# Patient Record
Sex: MALE | Race: WHITE | NOT HISPANIC OR LATINO | Employment: FULL TIME | URBAN - METROPOLITAN AREA
[De-identification: names, ages, dates, MRNs, and addresses within clinical notes are randomized per-mention and may not be internally consistent; named-entity substitution may affect disease eponyms.]

---

## 2023-09-15 PROBLEM — E78.2 MIXED HYPERLIPIDEMIA: Status: ACTIVE | Noted: 2023-09-15

## 2023-09-15 PROBLEM — G47.33 SEVERE OBSTRUCTIVE SLEEP APNEA: Status: ACTIVE | Noted: 2023-09-15

## 2023-09-15 PROBLEM — I10 ESSENTIAL HYPERTENSION: Status: ACTIVE | Noted: 2023-09-15

## 2023-09-15 PROBLEM — M54.30 SCIATICA: Status: ACTIVE | Noted: 2023-09-15

## 2023-09-15 PROBLEM — E11.65 TYPE 2 DIABETES MELLITUS WITH HYPERGLYCEMIA (MULTI): Status: ACTIVE | Noted: 2023-09-15

## 2023-09-15 PROBLEM — R06.02 SHORTNESS OF BREATH: Status: ACTIVE | Noted: 2023-09-15

## 2023-09-15 PROBLEM — D50.9 IRON DEFICIENCY ANEMIA: Status: ACTIVE | Noted: 2023-09-15

## 2023-09-15 PROBLEM — I82.409 ACUTE EMBOLISM AND THROMBOSIS OF UNSPECIFIED DEEP VEINS OF UNSPECIFIED LOWER EXTREMITY (MULTI): Status: ACTIVE | Noted: 2023-09-15

## 2023-09-15 PROBLEM — E66.09 OBESITY DUE TO EXCESS CALORIES: Status: ACTIVE | Noted: 2023-09-15

## 2023-09-15 RX ORDER — PEN NEEDLE, DIABETIC 32GX 5/32"
NEEDLE, DISPOSABLE MISCELLANEOUS
COMMUNITY
Start: 2022-12-13

## 2023-09-15 RX ORDER — INSULIN LISPRO 100 [IU]/ML
INJECTION, SOLUTION INTRAVENOUS; SUBCUTANEOUS
COMMUNITY
Start: 2017-04-10 | End: 2024-02-26 | Stop reason: WASHOUT

## 2023-09-15 RX ORDER — TIRZEPATIDE 7.5 MG/.5ML
12.5 INJECTION, SOLUTION SUBCUTANEOUS
COMMUNITY
Start: 2023-03-20

## 2023-09-15 RX ORDER — INSULIN GLARGINE 100 [IU]/ML
INJECTION, SOLUTION SUBCUTANEOUS
COMMUNITY
End: 2024-02-26 | Stop reason: WASHOUT

## 2023-09-15 RX ORDER — TIRZEPATIDE 5 MG/.5ML
INJECTION, SOLUTION SUBCUTANEOUS
COMMUNITY
Start: 2022-12-13 | End: 2024-02-26 | Stop reason: WASHOUT

## 2023-09-15 RX ORDER — LOSARTAN POTASSIUM 50 MG/1
TABLET ORAL
COMMUNITY
End: 2024-01-20 | Stop reason: DRUGHIGH

## 2023-09-15 RX ORDER — MECLIZINE HYDROCHLORIDE 25 MG/1
TABLET ORAL
COMMUNITY
End: 2024-02-26 | Stop reason: WASHOUT

## 2023-09-15 RX ORDER — TIZANIDINE 4 MG/1
1 TABLET ORAL NIGHTLY
COMMUNITY
Start: 2022-01-01 | End: 2024-02-26 | Stop reason: WASHOUT

## 2023-09-15 RX ORDER — ATORVASTATIN CALCIUM 20 MG/1
TABLET, FILM COATED ORAL
COMMUNITY
End: 2024-02-26 | Stop reason: WASHOUT

## 2023-09-15 RX ORDER — BISOPROLOL FUMARATE AND HYDROCHLOROTHIAZIDE 10; 6.25 MG/1; MG/1
TABLET ORAL
COMMUNITY
Start: 2017-09-01 | End: 2024-01-20

## 2023-09-15 RX ORDER — NAPROXEN 500 MG/1
TABLET ORAL
COMMUNITY
Start: 2017-12-20 | End: 2024-02-26 | Stop reason: SDUPTHER

## 2023-09-15 RX ORDER — ONDANSETRON 4 MG/1
TABLET, ORALLY DISINTEGRATING ORAL
COMMUNITY
End: 2024-02-26 | Stop reason: WASHOUT

## 2023-09-15 RX ORDER — INSULIN ASPART 100 [IU]/ML
INJECTION, SOLUTION INTRAVENOUS; SUBCUTANEOUS
COMMUNITY
Start: 2023-02-16 | End: 2023-12-18

## 2023-09-15 RX ORDER — ATORVASTATIN CALCIUM 40 MG/1
TABLET, FILM COATED ORAL
COMMUNITY
Start: 2017-08-25

## 2023-09-15 RX ORDER — AMOXICILLIN AND CLAVULANATE POTASSIUM 875; 125 MG/1; MG/1
1 TABLET, FILM COATED ORAL EVERY 12 HOURS
COMMUNITY
Start: 2022-01-01 | End: 2024-02-26 | Stop reason: WASHOUT

## 2023-09-15 RX ORDER — DULAGLUTIDE 3 MG/.5ML
INJECTION, SOLUTION SUBCUTANEOUS
COMMUNITY
Start: 2022-01-03 | End: 2024-02-26 | Stop reason: WASHOUT

## 2023-09-15 RX ORDER — FLASH GLUCOSE SENSOR
KIT MISCELLANEOUS
COMMUNITY
Start: 2022-10-02 | End: 2024-02-26 | Stop reason: WASHOUT

## 2023-09-15 RX ORDER — DEXAMETHASONE 6 MG/1
TABLET ORAL
COMMUNITY
Start: 2022-01-01 | End: 2024-02-26 | Stop reason: WASHOUT

## 2023-09-15 RX ORDER — LOSARTAN POTASSIUM 100 MG/1
TABLET ORAL
COMMUNITY
Start: 2017-04-24 | End: 2024-01-20

## 2023-09-15 RX ORDER — METHYLPREDNISOLONE 4 MG/1
TABLET ORAL
COMMUNITY
Start: 2017-11-17 | End: 2024-02-26 | Stop reason: WASHOUT

## 2023-09-15 RX ORDER — AMLODIPINE BESYLATE 10 MG/1
TABLET ORAL
COMMUNITY
Start: 2018-04-30 | End: 2023-12-08

## 2023-09-15 RX ORDER — PIOGLITAZONEHYDROCHLORIDE 15 MG/1
TABLET ORAL
COMMUNITY
End: 2024-01-21

## 2023-09-15 RX ORDER — BLOOD-GLUCOSE SENSOR
EACH MISCELLANEOUS
COMMUNITY
Start: 2022-11-19 | End: 2024-01-18

## 2023-09-15 RX ORDER — METFORMIN HYDROCHLORIDE 500 MG/1
2 TABLET, EXTENDED RELEASE ORAL 2 TIMES DAILY
COMMUNITY
Start: 2017-06-20 | End: 2024-01-21

## 2023-09-15 RX ORDER — NAPROXEN SODIUM 220 MG
TABLET ORAL
COMMUNITY

## 2023-09-15 RX ORDER — ASPIRIN 81 MG/1
TABLET ORAL
COMMUNITY

## 2023-09-15 RX ORDER — POTASSIUM CHLORIDE 750 MG/1
CAPSULE, EXTENDED RELEASE ORAL
COMMUNITY
Start: 2017-09-28 | End: 2024-01-20

## 2023-09-15 RX ORDER — BLOOD-GLUCOSE METER
EACH MISCELLANEOUS
COMMUNITY
Start: 2018-08-14

## 2023-11-04 DIAGNOSIS — Z79.4 TYPE 2 DIABETES MELLITUS WITH HYPERGLYCEMIA, WITH LONG-TERM CURRENT USE OF INSULIN (MULTI): Primary | ICD-10-CM

## 2023-11-04 DIAGNOSIS — E11.65 TYPE 2 DIABETES MELLITUS WITH HYPERGLYCEMIA, WITH LONG-TERM CURRENT USE OF INSULIN (MULTI): Primary | ICD-10-CM

## 2023-11-05 RX ORDER — INSULIN GLARGINE 100 [IU]/ML
INJECTION, SOLUTION SUBCUTANEOUS
Qty: 45 ML | Refills: 0 | Status: SHIPPED | OUTPATIENT
Start: 2023-11-05 | End: 2024-03-08 | Stop reason: SDUPTHER

## 2023-12-07 DIAGNOSIS — I10 ESSENTIAL HYPERTENSION: Primary | ICD-10-CM

## 2023-12-08 RX ORDER — AMLODIPINE BESYLATE 10 MG/1
10 TABLET ORAL DAILY
Qty: 90 TABLET | Refills: 0 | Status: SHIPPED | OUTPATIENT
Start: 2023-12-08 | End: 2024-01-20

## 2023-12-17 DIAGNOSIS — E11.65 TYPE 2 DIABETES MELLITUS WITH HYPERGLYCEMIA, WITH LONG-TERM CURRENT USE OF INSULIN (MULTI): Primary | ICD-10-CM

## 2023-12-17 DIAGNOSIS — Z79.4 TYPE 2 DIABETES MELLITUS WITH HYPERGLYCEMIA, WITH LONG-TERM CURRENT USE OF INSULIN (MULTI): Primary | ICD-10-CM

## 2023-12-18 RX ORDER — INSULIN ASPART 100 [IU]/ML
INJECTION, SOLUTION INTRAVENOUS; SUBCUTANEOUS DAILY
Qty: 45 ML | Refills: 0 | Status: SHIPPED | OUTPATIENT
Start: 2023-12-18

## 2024-01-17 DIAGNOSIS — Z79.4 TYPE 2 DIABETES MELLITUS WITH HYPERGLYCEMIA, WITH LONG-TERM CURRENT USE OF INSULIN (MULTI): Primary | ICD-10-CM

## 2024-01-17 DIAGNOSIS — E11.65 TYPE 2 DIABETES MELLITUS WITH HYPERGLYCEMIA, WITH LONG-TERM CURRENT USE OF INSULIN (MULTI): Primary | ICD-10-CM

## 2024-01-18 RX ORDER — BLOOD-GLUCOSE SENSOR
EACH MISCELLANEOUS
Qty: 9 EACH | Refills: 1 | Status: SHIPPED | OUTPATIENT
Start: 2024-01-18

## 2024-01-19 DIAGNOSIS — Z79.4 TYPE 2 DIABETES MELLITUS WITH HYPERGLYCEMIA, WITH LONG-TERM CURRENT USE OF INSULIN (MULTI): Primary | ICD-10-CM

## 2024-01-19 DIAGNOSIS — E11.65 TYPE 2 DIABETES MELLITUS WITH HYPERGLYCEMIA, WITH LONG-TERM CURRENT USE OF INSULIN (MULTI): Primary | ICD-10-CM

## 2024-01-19 DIAGNOSIS — I10 ESSENTIAL HYPERTENSION: Primary | ICD-10-CM

## 2024-01-20 RX ORDER — LOSARTAN POTASSIUM 100 MG/1
100 TABLET ORAL DAILY
Qty: 90 TABLET | Refills: 0 | Status: SHIPPED | OUTPATIENT
Start: 2024-01-20 | End: 2024-02-26 | Stop reason: WASHOUT

## 2024-01-20 RX ORDER — POTASSIUM CHLORIDE 750 MG/1
10 CAPSULE, EXTENDED RELEASE ORAL DAILY
Qty: 90 CAPSULE | Refills: 0 | Status: SHIPPED | OUTPATIENT
Start: 2024-01-20 | End: 2024-04-30

## 2024-01-20 RX ORDER — AMLODIPINE BESYLATE 10 MG/1
10 TABLET ORAL DAILY
Qty: 90 TABLET | Refills: 0 | Status: SHIPPED | OUTPATIENT
Start: 2024-01-20 | End: 2024-04-30

## 2024-01-20 RX ORDER — BISOPROLOL FUMARATE AND HYDROCHLOROTHIAZIDE 10; 6.25 MG/1; MG/1
1 TABLET ORAL DAILY
Qty: 90 TABLET | Refills: 0 | Status: SHIPPED | OUTPATIENT
Start: 2024-01-20 | End: 2024-03-18 | Stop reason: SDUPTHER

## 2024-01-21 RX ORDER — PIOGLITAZONEHYDROCHLORIDE 15 MG/1
15 TABLET ORAL DAILY
Qty: 90 TABLET | Refills: 1 | Status: SHIPPED | OUTPATIENT
Start: 2024-01-21 | End: 2025-01-20

## 2024-01-21 RX ORDER — METFORMIN HYDROCHLORIDE 500 MG/1
TABLET, EXTENDED RELEASE ORAL
Qty: 90 TABLET | Refills: 1 | Status: SHIPPED | OUTPATIENT
Start: 2024-01-21 | End: 2024-01-22 | Stop reason: SDUPTHER

## 2024-01-22 DIAGNOSIS — E11.65 TYPE 2 DIABETES MELLITUS WITH HYPERGLYCEMIA, WITH LONG-TERM CURRENT USE OF INSULIN (MULTI): ICD-10-CM

## 2024-01-22 DIAGNOSIS — Z79.4 TYPE 2 DIABETES MELLITUS WITH HYPERGLYCEMIA, WITH LONG-TERM CURRENT USE OF INSULIN (MULTI): ICD-10-CM

## 2024-01-22 RX ORDER — METFORMIN HYDROCHLORIDE 500 MG/1
TABLET, EXTENDED RELEASE ORAL
Qty: 360 TABLET | Refills: 0 | Status: SHIPPED | OUTPATIENT
Start: 2024-01-22 | End: 2024-04-30

## 2024-02-18 PROBLEM — M54.30 SCIATICA: Status: RESOLVED | Noted: 2023-09-15 | Resolved: 2024-02-18

## 2024-02-18 PROBLEM — J84.10 PULMONARY FIBROSIS (MULTI): Status: ACTIVE | Noted: 2024-02-18

## 2024-02-18 PROBLEM — E66.01 MORBID OBESITY WITH BMI OF 40.0-44.9, ADULT (MULTI): Status: ACTIVE | Noted: 2024-02-18

## 2024-02-18 PROBLEM — I82.409 ACUTE EMBOLISM AND THROMBOSIS OF UNSPECIFIED DEEP VEINS OF UNSPECIFIED LOWER EXTREMITY (MULTI): Status: RESOLVED | Noted: 2023-09-15 | Resolved: 2024-02-18

## 2024-02-18 PROBLEM — E11.9 TYPE 2 DIABETES MELLITUS (MULTI): Status: ACTIVE | Noted: 2024-02-18

## 2024-02-18 PROBLEM — Z79.4 LONG TERM (CURRENT) USE OF INSULIN (MULTI): Status: ACTIVE | Noted: 2024-02-18

## 2024-02-18 NOTE — PROGRESS NOTES
"HPI:       Hypertension:          The patient has no issues         The patients cardiovascular risk factors include:         Cardiac Risk Factors  Age > 45-male, > 55-female:  YES  +1   Smoking:   NO   Sig. family hx of CHD*:  YES  +1   Hypertension:   YES  +1   Diabetes:   YES  +1   HDL < 35:   YES  +1   HDL > 59:   NO   Total: 5     *- Sig. family h/o CHD per NCEP = MI or sudden death at <56yo in   father or other 1st-degree male relative, or <64yo in mother or   other 1st-degree female relative           The patients adherence to the treatment regimen is Good         Responses to the medications has been Good    Hyperlipidemia:   The patient does not use medications that may worsen dyslipidemias (corticosteroids, progestins, anabolic steroids, diuretics, beta-blockers, amiodarone, cyclosporine, olanzapine). The patient exercises infrequently.  The patient is not known to have coexisting coronary artery disease.    Pt has IDDM and sees endocrinology.  He uses cgm   MEDICAL HISTORY:   Past Medical History:   Diagnosis Date    History of COVID-19 12/2021    History of DVT (deep vein thrombosis)     Hyperlipidemia     Burtch    Hypertension     Iron deficiency anemia     Long term (current) use of insulin (CMS/HCC)     Morbid obesity with BMI of 40.0-44.9, adult (CMS/HCC)     CORTEZ (obstructive sleep apnea)     Mendoza/Strausbaugh    Pulmonary fibrosis (CMS/HCC)     Mendoza/Strausbaugh    Type 2 diabetes mellitus (CMS/HCC)     Burtch     MEDICATIONS:   Current Outpatient Medications   Medication Sig Dispense Refill    amLODIPine (Norvasc) 10 mg tablet Take 1 tablet (10 mg) by mouth once daily. 90 tablet 0    aspirin 81 mg EC tablet 1 tablet Orally Once a day      atorvastatin (Lipitor) 40 mg tablet 1 tablet Orally Once a day for 90 days      Basaglar KwikPen U-100 Insulin 100 unit/mL (3 mL) pen INJECT 40 UNITS SUBCUTANEOUSLY EVERY DAY AT BEDTIME 45 mL 0    BD Floridalma 2nd Gen Pen Needle 32 gauge x 5/32\" needle       " bisoproloL-hydrochlorothiazide (Ziac) 10-6.25 mg tablet Take 1 tablet by mouth once daily. (Patient taking differently: Take 2 tablets by mouth once daily.) 90 tablet 0    blood sugar diagnostic (OneTouch Verio test strips) strip OneTouch Verio In Vitro Strip   Quantity: 150  Refills: 0        Start : 14-Aug-2018   Active      blood-glucose sensor (Dexcom G7 Sensor) device USE AS DIRECTED EVERY 10 DAYS 9 each 1    metFORMIN  mg 24 hr tablet 4 pills daily 360 tablet 0    naproxen sodium (Aleve) 220 mg tablet 1 tab(s) orally once a day (at bedtime)//be careful with this medication...as you are on a blood thinner and it can cause gi upset and or bleeding//discuss this with  your doctor      NovoLOG Flexpen U-100 Insulin 100 unit/mL (3 mL) pen INJECT UP TO 50 UNITS SUBCUTANEOUSLY DAILY AS DIRECTED. 45 mL 0    pioglitazone (Actos) 15 mg tablet Take 1 tablet (15 mg) by mouth once daily. 90 tablet 1    potassium chloride ER (Micro-K) 10 mEq ER capsule Take 1 capsule (10 mEq) by mouth once daily. 90 capsule 0    tirzepatide (Mounjaro) 7.5 mg/0.5 mL pen injector 187.5 mg.       No current facility-administered medications for this visit.     ALLERGIES:   Allergies   Allergen Reactions    Ace Inhibitors Cough     FAMILY HISTORY:   Family History   Problem Relation Name Age of Onset    Hypothyroidism Mother      Hypertension Mother      Heart disease Father          with stent at age 60    Hypertension Father      No Known Problems Sister      No Known Problems Daughter      No Known Problems Son      Heart disease Maternal Grandmother      Cardiomyopathy Maternal Grandmother      Diabetes Paternal Grandmother      Hypertension Paternal Grandmother      Transient ischemic attack Paternal Grandmother      Heart disease Paternal Grandfather      Heart attack Paternal Grandfather      Cardiomyopathy Paternal Grandfather      Stroke Paternal Grandfather       SOCIAL HISTORY:   Social History     Tobacco Use    Smoking  status: Former     Types: Cigarettes    Smokeless tobacco: Never   Vaping Use    Vaping Use: Never used   Substance Use Topics    Alcohol use: Never    Drug use: Never         ROS:      CONSTITUTION:  alert and oriented     OPHTHALMOLOGY:          no Change in vision.         CARDIOLOGY:          no Chest pain.  no Dyspnea on exertion.  no Shortness of breath.         ENDOCRINOLOGY:          no Excessive thirst.  no Excessive urination.  no Fatigue.  no Skin changes.  no Weight gain.  no Weight loss.         SKIN:          no Healing problems.         NEUROLOGY:          no Loss of sensation in specific body area.  no Burning pain in feet.  no Peripheral neuropathy.  no Tingling/numbness.    LABS: due     VITAL SIGNS:  /86   Pulse 58   Wt 146 kg (320 lb 12.8 oz)   BMI 42.32 kg/m²     General Appearance: awake, alert, oriented, in no acute distress  Lungs: Normal expansion.  Clear to auscultation.  No rales, rhonchi, or wheezing.  Heart: Heart sounds are normal.  Regular rate and rhythm without murmur, gallop or rub.  Extremities: Extremities warm to touch, pink, with no edema.  Neurologic: Alert and oriented x 3, gait normal., reflexes normal and symmetric, strength and  sensation grossly normal    Mickey was seen today for hypertension.  Diagnoses and all orders for this visit:  Essential hypertension (Primary)  Comments:  cont amlodipine and ziac and potassium  Orders:  -     Albumin , Urine Random; Future  -     Basic Metabolic Panel; Future  -     Urinalysis with Reflex Microscopic; Future  Mixed hyperlipidemia  Comments:  cont atorvastatin  Orders:  -     Lipid Panel; Future  Type 2 diabetes mellitus with hyperglycemia, with long-term current use of insulin (CMS/[facility])  Comments:  f/u with endo; pt is overdue  Orders:  -     Hemoglobin A1C; Future  Morbid obesity with BMI of 40.0-44.9, adult (CMS/[facility])

## 2024-02-26 ENCOUNTER — LAB (OUTPATIENT)
Dept: LAB | Facility: LAB | Age: 59
End: 2024-02-26
Payer: COMMERCIAL

## 2024-02-26 ENCOUNTER — OFFICE VISIT (OUTPATIENT)
Dept: PRIMARY CARE | Facility: CLINIC | Age: 59
End: 2024-02-26
Payer: COMMERCIAL

## 2024-02-26 VITALS
DIASTOLIC BLOOD PRESSURE: 86 MMHG | HEART RATE: 58 BPM | BODY MASS INDEX: 42.32 KG/M2 | SYSTOLIC BLOOD PRESSURE: 130 MMHG | WEIGHT: 315 LBS

## 2024-02-26 DIAGNOSIS — E78.2 MIXED HYPERLIPIDEMIA: Primary | ICD-10-CM

## 2024-02-26 DIAGNOSIS — E78.2 MIXED HYPERLIPIDEMIA: ICD-10-CM

## 2024-02-26 DIAGNOSIS — E66.01 MORBID OBESITY WITH BMI OF 40.0-44.9, ADULT (MULTI): ICD-10-CM

## 2024-02-26 DIAGNOSIS — I10 ESSENTIAL HYPERTENSION: ICD-10-CM

## 2024-02-26 DIAGNOSIS — N28.9 ABNORMAL KIDNEY FUNCTION: ICD-10-CM

## 2024-02-26 DIAGNOSIS — E11.65 TYPE 2 DIABETES MELLITUS WITH HYPERGLYCEMIA, WITH LONG-TERM CURRENT USE OF INSULIN (MULTI): ICD-10-CM

## 2024-02-26 DIAGNOSIS — Z79.4 TYPE 2 DIABETES MELLITUS WITH HYPERGLYCEMIA, WITH LONG-TERM CURRENT USE OF INSULIN (MULTI): ICD-10-CM

## 2024-02-26 DIAGNOSIS — I10 ESSENTIAL HYPERTENSION: Primary | ICD-10-CM

## 2024-02-26 LAB
ANION GAP SERPL CALC-SCNC: 12 MMOL/L (ref 10–20)
APPEARANCE UR: CLEAR
BILIRUB UR STRIP.AUTO-MCNC: NEGATIVE MG/DL
BUN SERPL-MCNC: 25 MG/DL (ref 6–23)
CALCIUM SERPL-MCNC: 9.4 MG/DL (ref 8.6–10.3)
CHLORIDE SERPL-SCNC: 104 MMOL/L (ref 98–107)
CHOLEST SERPL-MCNC: 172 MG/DL (ref 0–199)
CHOLESTEROL/HDL RATIO: 4.9
CO2 SERPL-SCNC: 27 MMOL/L (ref 21–32)
COLOR UR: YELLOW
CREAT SERPL-MCNC: 1.31 MG/DL (ref 0.5–1.3)
CREAT UR-MCNC: 154 MG/DL (ref 20–370)
EGFRCR SERPLBLD CKD-EPI 2021: 63 ML/MIN/1.73M*2
EST. AVERAGE GLUCOSE BLD GHB EST-MCNC: 146 MG/DL
GLUCOSE SERPL-MCNC: 113 MG/DL (ref 74–99)
GLUCOSE UR STRIP.AUTO-MCNC: NEGATIVE MG/DL
HBA1C MFR BLD: 6.7 %
HDLC SERPL-MCNC: 35.4 MG/DL
KETONES UR STRIP.AUTO-MCNC: NEGATIVE MG/DL
LDLC SERPL CALC-MCNC: 111 MG/DL
LEUKOCYTE ESTERASE UR QL STRIP.AUTO: NEGATIVE
MICROALBUMIN UR-MCNC: 77.6 MG/L
MICROALBUMIN/CREAT UR: 50.4 UG/MG CREAT
NITRITE UR QL STRIP.AUTO: NEGATIVE
NON HDL CHOLESTEROL: 137 MG/DL (ref 0–149)
PH UR STRIP.AUTO: 5 [PH]
POTASSIUM SERPL-SCNC: 4.2 MMOL/L (ref 3.5–5.3)
PROT UR STRIP.AUTO-MCNC: NEGATIVE MG/DL
RBC # UR STRIP.AUTO: NEGATIVE /UL
SODIUM SERPL-SCNC: 139 MMOL/L (ref 136–145)
SP GR UR STRIP.AUTO: 1.02
TRIGL SERPL-MCNC: 129 MG/DL (ref 0–149)
UROBILINOGEN UR STRIP.AUTO-MCNC: <2 MG/DL
VLDL: 26 MG/DL (ref 0–40)

## 2024-02-26 PROCEDURE — 36415 COLL VENOUS BLD VENIPUNCTURE: CPT

## 2024-02-26 PROCEDURE — 3079F DIAST BP 80-89 MM HG: CPT | Performed by: FAMILY MEDICINE

## 2024-02-26 PROCEDURE — 82043 UR ALBUMIN QUANTITATIVE: CPT

## 2024-02-26 PROCEDURE — 83036 HEMOGLOBIN GLYCOSYLATED A1C: CPT

## 2024-02-26 PROCEDURE — 1036F TOBACCO NON-USER: CPT | Performed by: FAMILY MEDICINE

## 2024-02-26 PROCEDURE — 99214 OFFICE O/P EST MOD 30 MIN: CPT | Performed by: FAMILY MEDICINE

## 2024-02-26 PROCEDURE — 3008F BODY MASS INDEX DOCD: CPT | Performed by: FAMILY MEDICINE

## 2024-02-26 PROCEDURE — 3075F SYST BP GE 130 - 139MM HG: CPT | Performed by: FAMILY MEDICINE

## 2024-02-26 PROCEDURE — 82570 ASSAY OF URINE CREATININE: CPT

## 2024-02-26 ASSESSMENT — PAIN SCALES - GENERAL: PAINLEVEL: 0-NO PAIN

## 2024-02-26 ASSESSMENT — PATIENT HEALTH QUESTIONNAIRE - PHQ9
1. LITTLE INTEREST OR PLEASURE IN DOING THINGS: NOT AT ALL
2. FEELING DOWN, DEPRESSED OR HOPELESS: NOT AT ALL
SUM OF ALL RESPONSES TO PHQ9 QUESTIONS 1 AND 2: 0

## 2024-03-07 NOTE — PROGRESS NOTES
"HPI    57 yo with Diabetes 2 (dx mid 40's), HTN, Dyslipidemia , CORTEZ on cpap presents for followup. Pt's last A1c-7.1%, 2/26/2024 6.7%. Pt is testing sugars >4 times per day using dexcom. Pt is having low sugars <1 times/week. Pt is struggling following a carb controlled diet and knows reasonable carb allowances. Pt is able to afford their medications.           Taking basaglar 35 units - he decreased to 25 units, novolog 15-20 breakfast, 20-30 lunch, 20-25 dinner - was asked to increase dinner to 25-35 units last visit, mounjaro 10mg weekly/tolerating, metformin er 500mg (4), shreya 15mg                Pt still interested in wt loss surgery, plans to check on coverage in 2024 with new insurance           30 day dexcom G7 data: 60% in range, 2% low, pattern: mid 100's overnight, waking 100, upper 100's lunch, similar at dinner, mid 200's post dinner into bedtime.            Pt had workup for low tesosterone and it was normal         -taking atorvastatin 40mg daily had aches, pcp changed to weekly         -taking losartan 100mg, amlodipine 10mg, bisoprolol 10/6.25 bid .        Current Outpatient Medications:     amLODIPine (Norvasc) 10 mg tablet, Take 1 tablet (10 mg) by mouth once daily., Disp: 90 tablet, Rfl: 0    aspirin 81 mg EC tablet, 1 tablet Orally Once a day, Disp: , Rfl:     atorvastatin (Lipitor) 40 mg tablet, 1 tablet Orally Once a day for 90 days, Disp: , Rfl:     Basaglar KwikPen U-100 Insulin 100 unit/mL (3 mL) pen, INJECT 40 UNITS SUBCUTANEOUSLY EVERY DAY AT BEDTIME, Disp: 45 mL, Rfl: 0    BD Floridalma 2nd Gen Pen Needle 32 gauge x 5/32\" needle, , Disp: , Rfl:     bisoproloL-hydrochlorothiazide (Ziac) 10-6.25 mg tablet, Take 1 tablet by mouth once daily. (Patient taking differently: Take 2 tablets by mouth once daily.), Disp: 90 tablet, Rfl: 0    blood sugar diagnostic (OneTouch Verio test strips) strip, OneTouch Verio In Vitro Strip  Quantity: 150  Refills: 0      Start : 14-Aug-2018  Active, Disp: , Rfl:     " blood-glucose sensor (Dexcom G7 Sensor) device, USE AS DIRECTED EVERY 10 DAYS, Disp: 9 each, Rfl: 1    metFORMIN  mg 24 hr tablet, 4 pills daily, Disp: 360 tablet, Rfl: 0    naproxen sodium (Aleve) 220 mg tablet, 1 tab(s) orally once a day (at bedtime)//be careful with this medication...as you are on a blood thinner and it can cause gi upset and or bleeding//discuss this with  your doctor, Disp: , Rfl:     NovoLOG Flexpen U-100 Insulin 100 unit/mL (3 mL) pen, INJECT UP TO 50 UNITS SUBCUTANEOUSLY DAILY AS DIRECTED., Disp: 45 mL, Rfl: 0    pioglitazone (Actos) 15 mg tablet, Take 1 tablet (15 mg) by mouth once daily., Disp: 90 tablet, Rfl: 1    potassium chloride ER (Micro-K) 10 mEq ER capsule, Take 1 capsule (10 mEq) by mouth once daily., Disp: 90 capsule, Rfl: 0    tirzepatide (Mounjaro) 7.5 mg/0.5 mL pen injector, 187.5 mg., Disp: , Rfl:     Allergies as of 03/08/2024 - Reviewed 03/08/2024   Allergen Reaction Noted    Ace inhibitors Cough 09/15/2023       /76 (BP Location: Right arm, Patient Position: Sitting)   Pulse 63   Wt 144 kg (318 lb 6.4 oz)   BMI 42.01 kg/m²     Labs:   Lab Results   Component Value Date    WBC 6.4 03/22/2023    NRBC 0 03/22/2023    RBC 4.68 03/22/2023    HGB 14.5 03/22/2023    HCT 44.2 03/22/2023     03/22/2023     Lab Results   Component Value Date    CALCIUM 9.4 02/26/2024    AST 18 03/22/2023    ALKPHOS 98 03/22/2023    BILITOT 0.5 03/22/2023    PROT 6.4 03/22/2023    ALBUMIN 3.8 03/22/2023    GLOB 2.6 03/22/2023    AGR 1.5 03/22/2023     02/26/2024    K 4.2 02/26/2024     02/26/2024    CO2 27 02/26/2024    ANIONGAP 12 02/26/2024    BUN 25 (H) 02/26/2024    CREATININE 1.31 (H) 02/26/2024    UREACREAUR 13.1 03/22/2023    GLUCOSE 113 (H) 02/26/2024    ALT 19 03/22/2023    EGFR 63 02/26/2024     Lab Results   Component Value Date    CHOL 172 02/26/2024    TRIG 129 02/26/2024    HDL 35.4 02/26/2024    LDLCALC 111 (H) 02/26/2024     Lab Results   Component  "Value Date    MICROALBCREA 50.4 (H) 02/26/2024     No results found for: \"TSH\"  Lab Results   Component Value Date    NPMUTOMF86 380 03/22/2023     Lab Results   Component Value Date    HGBA1C 6.7 (H) 02/26/2024       Assessment/Plan   1. Type 2 diabetes mellitus with hyperglycemia, with long-term current use of insulin (CMS/[facility])  -dexcom G7 data reviewed with patient (scanned in epic)   -reviewed clarity reports and glycemic targets  -postprandial hyperglycemia dinner, increase novolog to 25-35 units, targeting hs blood sugar in 150's  -instructed on using ISF 20>150, he has been taking large doses for correction/guessing at doses causing hypoglycemia  -weight loss since last visit 7.4 lbs, increase mounjaro to 12.5 mg weekly  -reinforced carb limits at meals and mindful eating  -pt c/o increased cost of dexcom at pharmacy, advised calling [employer] [facility] for CGM since now on medical mutual insurance    2. Essential hypertension  -at target    3. Mixed hyperlipidemia  -on weekly statin and tolerating      Follow up: 3-4 months dr. starr    -labs/tests/notes reviewed  -reviewed and counseled patient on medication monitoring and side effects    Treatment and plan discussed with Dr. Starr. Francesca RN Certified Diabetes Care and     Medical Decision Making  Complexity of problem: Chronic illness of diabetes mellitus uncontrolled, progressing  Data analyzed and reviewed: Reviewed prior notes, blood glucose data, labs including HgbA1c, lipids, serum chemistries.  Ordered tests.   Risk of complications and morbidities: Is definite because of use of insulin and risk of hypoglycemia.  Prescription medications reviewed and modifications made.  Compliance assessed.  Addressed social determinants of health including food insecurity.    "

## 2024-03-08 ENCOUNTER — CLINICAL SUPPORT (OUTPATIENT)
Dept: ENDOCRINOLOGY | Facility: CLINIC | Age: 59
End: 2024-03-08
Payer: COMMERCIAL

## 2024-03-08 VITALS
SYSTOLIC BLOOD PRESSURE: 121 MMHG | HEART RATE: 63 BPM | BODY MASS INDEX: 42.01 KG/M2 | WEIGHT: 315 LBS | DIASTOLIC BLOOD PRESSURE: 76 MMHG

## 2024-03-08 DIAGNOSIS — I10 ESSENTIAL HYPERTENSION: ICD-10-CM

## 2024-03-08 DIAGNOSIS — Z79.4 TYPE 2 DIABETES MELLITUS WITH HYPERGLYCEMIA, WITH LONG-TERM CURRENT USE OF INSULIN (MULTI): ICD-10-CM

## 2024-03-08 DIAGNOSIS — Z79.4 TYPE 2 DIABETES MELLITUS WITH HYPERGLYCEMIA, WITH LONG-TERM CURRENT USE OF INSULIN (MULTI): Primary | ICD-10-CM

## 2024-03-08 DIAGNOSIS — E78.2 MIXED HYPERLIPIDEMIA: ICD-10-CM

## 2024-03-08 DIAGNOSIS — E11.65 TYPE 2 DIABETES MELLITUS WITH HYPERGLYCEMIA, WITH LONG-TERM CURRENT USE OF INSULIN (MULTI): ICD-10-CM

## 2024-03-08 DIAGNOSIS — E11.65 TYPE 2 DIABETES MELLITUS WITH HYPERGLYCEMIA, WITH LONG-TERM CURRENT USE OF INSULIN (MULTI): Primary | ICD-10-CM

## 2024-03-08 PROCEDURE — 99214 OFFICE O/P EST MOD 30 MIN: CPT | Performed by: INTERNAL MEDICINE

## 2024-03-08 PROCEDURE — 95251 CONT GLUC MNTR ANALYSIS I&R: CPT | Performed by: INTERNAL MEDICINE

## 2024-03-08 RX ORDER — INSULIN GLARGINE 100 [IU]/ML
INJECTION, SOLUTION SUBCUTANEOUS
Qty: 45 ML | Refills: 1 | Status: SHIPPED | OUTPATIENT
Start: 2024-03-08 | End: 2024-03-08

## 2024-03-08 RX ORDER — INSULIN LISPRO 100 [IU]/ML
INJECTION, SOLUTION INTRAVENOUS; SUBCUTANEOUS
Qty: 100 ML | Refills: 1 | Status: SHIPPED | OUTPATIENT
Start: 2024-03-08

## 2024-03-08 RX ORDER — TIRZEPATIDE 12.5 MG/.5ML
12.5 INJECTION, SOLUTION SUBCUTANEOUS
Qty: 6 ML | Refills: 1 | Status: SHIPPED | OUTPATIENT
Start: 2024-03-08 | End: 2024-03-11 | Stop reason: SDUPTHER

## 2024-03-08 RX ORDER — INSULIN LISPRO 100 [IU]/ML
INJECTION, SOLUTION INTRAVENOUS; SUBCUTANEOUS
Qty: 90 ML | Refills: 1 | Status: SHIPPED | OUTPATIENT
Start: 2024-03-08 | End: 2024-03-08

## 2024-03-08 RX ORDER — INSULIN GLARGINE 100 [IU]/ML
INJECTION, SOLUTION SUBCUTANEOUS
Qty: 50 ML | Refills: 1 | Status: SHIPPED | OUTPATIENT
Start: 2024-03-08

## 2024-03-08 RX ORDER — LOSARTAN POTASSIUM 100 MG/1
100 TABLET ORAL DAILY
COMMUNITY
End: 2024-05-16 | Stop reason: SDUPTHER

## 2024-03-08 ASSESSMENT — PAIN SCALES - GENERAL: PAINLEVEL: 0-NO PAIN

## 2024-03-08 NOTE — PATIENT INSTRUCTIONS
Increase dinner humalog to 25-35 units - goal is to be 150 at bedtime    Correct blood sugar with 1 unit to lower you 20 points above 150    Increase Mounjaro to 12.5 mg weekly    [facility], mounjaro and basaglar    Call [employer] (durable medical equipment company) about the dexcom G7 or the marty 3

## 2024-03-08 NOTE — PROGRESS NOTES
I, Dr Morgan Starr, have reviewed this progress note, medication list, vital signs, any pertinent lab values, and any CGM data if present with the Certified Diabetes Care and  face to face during this visit today. This note reflects the treatment plan that was made under my direction after reviewing the above mentioned elements while face to face with the patient and CDE.  I personally answered and addressed any questions and concerns the patient had during the visit today.  The CDE entered the data in this note under my direction and I personally reviewed it, signed any lab or medication orders that I instructed to be completed. I am the billing provider for this visit and the level of service was determined by my involvement in the Medical Decision Making Component of this visit while face to face with the patient.    Electronically signed by Morgan Starr MD on 3/8/24 at 12:12 PM.

## 2024-03-11 DIAGNOSIS — Z79.4 TYPE 2 DIABETES MELLITUS WITH HYPERGLYCEMIA, WITH LONG-TERM CURRENT USE OF INSULIN (MULTI): ICD-10-CM

## 2024-03-11 DIAGNOSIS — E11.65 TYPE 2 DIABETES MELLITUS WITH HYPERGLYCEMIA, WITH LONG-TERM CURRENT USE OF INSULIN (MULTI): ICD-10-CM

## 2024-03-11 RX ORDER — TIRZEPATIDE 12.5 MG/.5ML
12.5 INJECTION, SOLUTION SUBCUTANEOUS
Qty: 6 ML | Refills: 1 | Status: SHIPPED | OUTPATIENT
Start: 2024-03-11 | End: 2024-03-12 | Stop reason: SDUPTHER

## 2024-03-12 ENCOUNTER — PHARMACY VISIT (OUTPATIENT)
Dept: PHARMACY | Facility: CLINIC | Age: 59
End: 2024-03-12
Payer: COMMERCIAL

## 2024-03-12 DIAGNOSIS — E11.65 TYPE 2 DIABETES MELLITUS WITH HYPERGLYCEMIA, WITH LONG-TERM CURRENT USE OF INSULIN (MULTI): ICD-10-CM

## 2024-03-12 DIAGNOSIS — Z79.4 TYPE 2 DIABETES MELLITUS WITH HYPERGLYCEMIA, WITH LONG-TERM CURRENT USE OF INSULIN (MULTI): ICD-10-CM

## 2024-03-12 PROCEDURE — RXMED WILLOW AMBULATORY MEDICATION CHARGE

## 2024-03-12 RX ORDER — TIRZEPATIDE 12.5 MG/.5ML
12.5 INJECTION, SOLUTION SUBCUTANEOUS
Qty: 6 ML | Refills: 1 | Status: SHIPPED | OUTPATIENT
Start: 2024-03-12 | End: 2024-04-30

## 2024-03-12 NOTE — TELEPHONE ENCOUNTER
Mickey Vera   1965   [id number]   [phone]       Patient called and mentioned that [facility] do not have Mounjaro but  [facility] does.

## 2024-03-18 DIAGNOSIS — I10 ESSENTIAL HYPERTENSION: ICD-10-CM

## 2024-03-18 RX ORDER — BISOPROLOL FUMARATE AND HYDROCHLOROTHIAZIDE 10; 6.25 MG/1; MG/1
2 TABLET ORAL DAILY
Qty: 180 TABLET | Refills: 1 | Status: SHIPPED | OUTPATIENT
Start: 2024-03-18 | End: 2024-09-14

## 2024-03-18 NOTE — TELEPHONE ENCOUNTER
Last OV 2/26/24-Patient called and states he needs a new RX sent for 2 tabs daily which he has always been taking, he was given an old RX for 1 tab daily. He has a few pills left[facility]

## 2024-03-27 ENCOUNTER — TELEPHONE (OUTPATIENT)
Dept: PULMONOLOGY | Facility: CLINIC | Age: 59
End: 2024-03-27
Payer: COMMERCIAL

## 2024-03-28 NOTE — TELEPHONE ENCOUNTER
Its too soon, he can't have it until after April 13th. I put the order in to not even be active until April 12 so I'm not sure why they are even running it already

## 2024-03-29 ENCOUNTER — APPOINTMENT (OUTPATIENT)
Dept: RADIOLOGY | Facility: HOSPITAL | Age: 59
End: 2024-03-29
Payer: COMMERCIAL

## 2024-04-08 DIAGNOSIS — Z87.891 FORMER SMOKER: Primary | ICD-10-CM

## 2024-04-08 PROCEDURE — RXMED WILLOW AMBULATORY MEDICATION CHARGE

## 2024-04-12 ENCOUNTER — PHARMACY VISIT (OUTPATIENT)
Dept: PHARMACY | Facility: CLINIC | Age: 59
End: 2024-04-12
Payer: COMMERCIAL

## 2024-04-30 DIAGNOSIS — Z79.4 TYPE 2 DIABETES MELLITUS WITH HYPERGLYCEMIA, WITH LONG-TERM CURRENT USE OF INSULIN (MULTI): ICD-10-CM

## 2024-04-30 DIAGNOSIS — E11.65 TYPE 2 DIABETES MELLITUS WITH HYPERGLYCEMIA, WITH LONG-TERM CURRENT USE OF INSULIN (MULTI): ICD-10-CM

## 2024-04-30 DIAGNOSIS — I10 ESSENTIAL HYPERTENSION: ICD-10-CM

## 2024-04-30 RX ORDER — METFORMIN HYDROCHLORIDE 500 MG/1
TABLET, EXTENDED RELEASE ORAL
Qty: 360 TABLET | Refills: 3 | Status: SHIPPED | OUTPATIENT
Start: 2024-04-30

## 2024-04-30 RX ORDER — POTASSIUM CHLORIDE 750 MG/1
10 CAPSULE, EXTENDED RELEASE ORAL DAILY
Qty: 90 CAPSULE | Refills: 1 | Status: SHIPPED | OUTPATIENT
Start: 2024-04-30

## 2024-04-30 RX ORDER — TIRZEPATIDE 12.5 MG/.5ML
12.5 INJECTION, SOLUTION SUBCUTANEOUS
Qty: 6 ML | Refills: 1 | Status: SHIPPED | OUTPATIENT
Start: 2024-05-05

## 2024-04-30 RX ORDER — AMLODIPINE BESYLATE 10 MG/1
10 TABLET ORAL DAILY
Qty: 90 TABLET | Refills: 1 | Status: SHIPPED | OUTPATIENT
Start: 2024-04-30

## 2024-04-30 NOTE — TELEPHONE ENCOUNTER
Last office visit: 02/26/24  Next office visit:  08/19/24  Last bmp  02/26/24 has an order for future

## 2024-05-16 DIAGNOSIS — I10 ESSENTIAL HYPERTENSION: Primary | ICD-10-CM

## 2024-05-16 RX ORDER — LOSARTAN POTASSIUM 100 MG/1
100 TABLET ORAL DAILY
Qty: 90 TABLET | Refills: 0 | Status: SHIPPED | OUTPATIENT
Start: 2024-05-16

## 2024-06-12 NOTE — PROGRESS NOTES
Subjective   Patient ID: Mickey Vera is a 58 y.o. male who presents for No chief complaint on file..  HPI  BARIATRIC CONSULT  START WT:  311  IDEAL WT:  184  START EXCESS: 127 HT: 71.75 IN  YRS OF OBESITY: 25  GREATEST WT LOSS IN LBS: 10+  PROGRAMS FOR WT LOSS IN THE PAST: slim fast, dietitian visits, low [place] low fat  HX: BLOOD CLOTS WHEN HAD PNEUMONIA  Works as supervisor/   Review of Systems  Allergy/Immunologic:          HIV / AIDS No.  Hepatitis A No.  Hepatitis B No.  Hepatitis C No.  Immunosuppressent drugs No.         HEENT:          Headache negative.         CARDIOLOGY:          History of Hyperlipidemia YES.  Last stress test N/A.  Last echocardiogram N/A.  Chest pain No.  High blood pressure YES.  Irregular heart beat No.  Known coronary artery disease No.  Pacemaker No.  Palpitations No.         RESPIRATORY:          Hx steroid use  YES.  ER visits or Hospitalizations for breathing problems No.  Sleep Apnea yes, cpap.  PANCHAL (dyspnea on exertion) No.  Hx of Asthma/COPD No.         GASTROENTEROLOGY:          Peptic ulcer No, Last EGD N/A, Last UGI N/A.  Colonoscopy  Last Colonoscopy N/A.  Heartburn No.         ENDOCRINOLOGY:          Diabetes  YES..  Thyroid disorder No.         EXTREMITIES:          Varicose Veins No.  Stasis Ulcers No.  Ankle swelling No.  Personal history DVT yes.  Personal history PE No.  Personal history of other thrombolic events No.  Family history of VTE No.  Known genetic bleeding or clotting disorder No.         UROLOGY:          Kidney disease No.  Kidney stones  YES.  Previous UTIs No.  Urinary incontinence No.         MUSCULOSKELETAL:          Osteoporosis/Osteopenia No.  Arthritis No.  Joint pain No.         SKIN:          Hidradenitis No.  Open skin wounds No.  Rosacea No.  Healing problems No.         PSYCHOLOGY:          Anxiety none.  Depression none.  Eating disorder denies.        Objective   Physical Exam    Assessment/Plan            Devorah JOY  LATONIA West 06/12/24 4:54 PM

## 2024-06-21 ENCOUNTER — APPOINTMENT (OUTPATIENT)
Dept: SURGERY | Facility: CLINIC | Age: 59
End: 2024-06-21
Payer: COMMERCIAL

## 2024-06-21 VITALS
HEART RATE: 65 BPM | DIASTOLIC BLOOD PRESSURE: 93 MMHG | HEIGHT: 72 IN | BODY MASS INDEX: 42.12 KG/M2 | SYSTOLIC BLOOD PRESSURE: 150 MMHG | WEIGHT: 311 LBS

## 2024-06-21 DIAGNOSIS — E11.65 TYPE 2 DIABETES MELLITUS WITH HYPERGLYCEMIA, WITH LONG-TERM CURRENT USE OF INSULIN (MULTI): ICD-10-CM

## 2024-06-21 DIAGNOSIS — I10 ESSENTIAL HYPERTENSION: ICD-10-CM

## 2024-06-21 DIAGNOSIS — Z91.89 AT RISK FOR DEEP VENOUS THROMBOSIS: ICD-10-CM

## 2024-06-21 DIAGNOSIS — Z79.4 TYPE 2 DIABETES MELLITUS WITH HYPERGLYCEMIA, WITH LONG-TERM CURRENT USE OF INSULIN (MULTI): ICD-10-CM

## 2024-06-21 DIAGNOSIS — Z79.4 LONG TERM (CURRENT) USE OF INSULIN (MULTI): ICD-10-CM

## 2024-06-21 DIAGNOSIS — E78.2 MIXED HYPERLIPIDEMIA: ICD-10-CM

## 2024-06-21 DIAGNOSIS — E66.01 MORBID OBESITY WITH BMI OF 40.0-44.9, ADULT (MULTI): Primary | ICD-10-CM

## 2024-06-21 DIAGNOSIS — K30 DELAYED GASTRIC EMPTYING: ICD-10-CM

## 2024-06-21 DIAGNOSIS — G47.33 SEVERE OBSTRUCTIVE SLEEP APNEA: ICD-10-CM

## 2024-06-21 DIAGNOSIS — E66.01 MORBID (SEVERE) OBESITY DUE TO EXCESS CALORIES (MULTI): ICD-10-CM

## 2024-06-21 PROCEDURE — 3008F BODY MASS INDEX DOCD: CPT | Performed by: SURGERY

## 2024-06-21 PROCEDURE — 4010F ACE/ARB THERAPY RXD/TAKEN: CPT | Performed by: SURGERY

## 2024-06-21 PROCEDURE — 3080F DIAST BP >= 90 MM HG: CPT | Performed by: SURGERY

## 2024-06-21 PROCEDURE — 3077F SYST BP >= 140 MM HG: CPT | Performed by: SURGERY

## 2024-06-21 PROCEDURE — 99204 OFFICE O/P NEW MOD 45 MIN: CPT | Performed by: SURGERY

## 2024-06-21 PROCEDURE — 3060F POS MICROALBUMINURIA REV: CPT | Performed by: SURGERY

## 2024-06-21 PROCEDURE — 3049F LDL-C 100-129 MG/DL: CPT | Performed by: SURGERY

## 2024-06-21 PROCEDURE — 3044F HG A1C LEVEL LT 7.0%: CPT | Performed by: SURGERY

## 2024-06-21 ASSESSMENT — COLUMBIA-SUICIDE SEVERITY RATING SCALE - C-SSRS
2. HAVE YOU ACTUALLY HAD ANY THOUGHTS OF KILLING YOURSELF?: NO
6. HAVE YOU EVER DONE ANYTHING, STARTED TO DO ANYTHING, OR PREPARED TO DO ANYTHING TO END YOUR LIFE?: NO
1. IN THE PAST MONTH, HAVE YOU WISHED YOU WERE DEAD OR WISHED YOU COULD GO TO SLEEP AND NOT WAKE UP?: NO

## 2024-06-21 ASSESSMENT — PATIENT HEALTH QUESTIONNAIRE - PHQ9
SUM OF ALL RESPONSES TO PHQ9 QUESTIONS 1 AND 2: 0
2. FEELING DOWN, DEPRESSED OR HOPELESS: NOT AT ALL
1. LITTLE INTEREST OR PLEASURE IN DOING THINGS: NOT AT ALL

## 2024-06-21 ASSESSMENT — PAIN SCALES - GENERAL: PAINLEVEL: 0-NO PAIN

## 2024-06-21 NOTE — H&P
History Of Present Illness  Mickey Vera is a 58 y.o. man here for consultation for bariatric surgery. He suffers from morbid obesity and has been overweight for many years and has a number of weight related comorbidities. He has attempted to lose weight several times over the years. He has had successes but has regained the weight at the completion of each of his dieting attempts. He is being referred for surgical intervention for his clinically significant morbid obesity.     -Bariatric Consultation:         START WT:  311     IDEAL WT:  184       START EXCESS:   127      HT: 71.75 IN     Past Medical History  He has a past medical history of History of COVID-19 (12/2021), History of DVT (deep vein thrombosis), Hyperlipidemia, Hypertension, Iron deficiency anemia, Long term (current) use of insulin (Multi), Morbid obesity with BMI of 40.0-44.9, adult (Multi), CORTEZ (obstructive sleep apnea), Pulmonary fibrosis (Multi), and Type 2 diabetes mellitus (Multi).    Surgical History  He has a past surgical history that includes Rhinoplasty (1983); Anterior cruciate ligament repair (Right, 1984); Hand surgery (Left, 2014); and Finger surgery (Right).     Social History  He reports that he has quit smoking. His smoking use included cigarettes. He has never used smokeless tobacco. He reports that he does not drink alcohol and does not use drugs.    Family History  Family History   Problem Relation Name Age of Onset    Hypothyroidism Mother      Hypertension Mother      Heart disease Father          with stent at age 60    Hypertension Father      No Known Problems Sister      No Known Problems Daughter      No Known Problems Son      Heart disease Maternal Grandmother      Cardiomyopathy Maternal Grandmother      Diabetes Paternal Grandmother      Hypertension Paternal Grandmother      Transient ischemic attack Paternal Grandmother      Heart disease Paternal Grandfather      Heart attack Paternal Grandfather       Cardiomyopathy Paternal Grandfather      Stroke Paternal Grandfather          Allergies  Ace inhibitors    Review of Systems   Allergy/Immunologic:          HIV / AIDS No.  Hepatitis A No.  Hepatitis B No.  Hepatitis C No.  Immunosuppressent drugs No.         HEENT:          Headache negative.         CARDIOLOGY:          History of Hyperlipidemia No.  Last stress test N/A.  Last echocardiogram N/A.  Chest pain No.  High blood pressure No.  Irregular heart beat No.  Known coronary artery disease No.  Pacemaker No.  Palpitations No.         RESPIRATORY:          Hx steroid use No.  ER visits or Hospitalizations for breathing problems No.  Sleep Apnea yes, cpap.  PANCHAL (dyspnea on exertion) No.  Hx of Asthma/COPD No.         GASTROENTEROLOGY:          Peptic ulcer No, Last EGD N/A, Last UGI N/A.  Colonoscopy  Last Colonoscopy N/A.  Heartburn No.         ENDOCRINOLOGY:          Diabetes No.  Thyroid disorder No.         EXTREMITIES:          Varicose Veins No.  Stasis Ulcers No.  Ankle swelling No.  Personal history DVT yes.  Personal history PE No.  Personal history of other thrombolic events No.  Family history of VTE No.  Known genetic bleeding or clotting disorder No.         UROLOGY:          Kidney disease No.  Kidney stones No.  Previous UTIs No.  Urinary incontinence No.         MUSCULOSKELETAL:          Osteoporosis/Osteopenia No.  Arthritis No.  Joint pain No.         SKIN:          Hidradenitis No.  Open skin wounds No.  Rosacea No.  Healing problems No.         PSYCHOLOGY:          Anxiety none.  Depression none.  Eating disorder denies.            Physical Exam   General Examination:         GENERAL APPEARANCE: alert and oriented x 3, Pleasant and cooperative, No Acute Distress.          HEENT: jeniffer MCCRACKEN.          NECK: no lymphadenopathy, no thyromegaly, no JVD, normal flexion, normal extension.          HEART: regular rate and rhythm.          LUNGS: clear to auscultation bilaterally.          CHEST:  "normal shape and expansion.          ABDOMEN: obese, short midepigastric midline scar from prev laparotomy, no hernias present, soft and not tender, no guarding, no CVA tenderness.          EXTREMITIES: pulses 2 plus bilaterally, minimal edema, no ulcerations.          SKIN: normal, no rash, multiple tattoos.          NEUROLOGIC EXAM: CN's II-XII grossly intact, gait normal.          BACK: no CVA tenderness.       Last Recorded Vitals  Blood pressure (!) 150/93, pulse 65, height 1.822 m (5' 11.75\"), weight 141 kg (311 lb).      Assessment/Plan   Problem List Items Addressed This Visit             ICD-10-CM    Essential hypertension I10    Mixed hyperlipidemia E78.2    Severe obstructive sleep apnea G47.33    Type 2 diabetes mellitus with hyperglycemia (Multi) E11.65    Long term (current) use of insulin (Multi) Z79.4    Morbid obesity with BMI of 40.0-44.9, adult (Multi) - Primary E66.01, Z68.41    At risk for deep venous thrombosis Z91.89     Other Visit Diagnoses         Codes    Morbid (severe) obesity due to excess calories (Multi)     E66.01    Delayed gastric emptying     K30    Relevant Orders    NM gastric emptying solid            We spent 45 minutes reviewing the three surgical options available in the treatment of morbid obesity in our practice. We reviewed the laparoscopic approach to the Markus-en-Y gastric bypass, the vertical sleeve gastrectomy, and the adjustable gastric band. We reviewed the surgical technique, the risks, and my complication rates. We also reviewed the expected weight loss, the rules necessary for hardik-term success, the nutritional supplementation recommended for each operation, and the importance of incorporating excercise into the lifestyle to maintain the weight. We reviewed both the national history with each operation, my experience with each operation, the lack of long-term weight loss data with the vertical sleeve gastrectomy and the potential for exacerbating reflux with the " vertical sleeve gastrectomy. In addition, we discussed the risk of adhesions, internal hernias, iron and calcium deficiency, dumping syndrome and potential for gastrojejunal ulcer with the RYGB. Branden has a parent that had RYGB and a parent that had VSG.  He came in to the consult interested in VSG.  However, after learning more about the operations, he is leaning towards proceeding with RYGB.  He had pyloric stenosis as an infant so we will obtain gastric emptying and EGD preoperatively.  Branden will be discharged on lovenox in attempt to reduce perioperative risk of developing a VTE.       I spent 48 minutes in the professional and overall care of this patient.      Elier Levi MD

## 2024-06-24 NOTE — PROGRESS NOTES
Initial Medical Weight Loss Appointment ([facility] 1)     Starting Weight: 311 lb  Current Weight: 318 lb  Current BMI: 43.43    Diet Experience: Mickey reports that he has thought about bariatric surgery for awhile now and that both his parents have been through the surgery. He reports trying diets in the past such as Nutrisystem, but having the most weight loss success with watching his sugar intake and watching his portion sizes. He currently continues to do so. He reports his heaviest weight was 360#. He also followed up with a nutritionist in the past regularly for 1-2 years, but reports that at the time he did not follow the diet/instructions as closely as he should have.   Pt notes he quit smoking cigarettes 12-14 years ago. He currently smokes cigars on occasion.   Pt Seeking: leaning towards RYGB  Pertinent Co-morbidities: diabetes, high blood pressure, sleep apnea. Uses CPAP consistently per pt.   Current Daily Intake: mostly 2 meals/day.   Breakfast- usually skips on the weekdays. Maybe an egg omelette with ground meat on the weekends.   Lunch- a salad, OR a sandwich, OR half salad half sandwich, all with a side of crackers and carrots/cucumbers   Dinner- breaded chicken tenders with steamed vegetables, OR pasta dish 2x/week, OR a burger, OR steak with brussels spouts and a baked potato. Usually using the microwave/airfryer to make meals.   How many times do you order takeout, fast food and/or go out to eat per week? 3x, mostly on the weekends.   Snacks: usually when home from work- mixed nuts, trail mix, fruit, donuts, cookies. Pt notes that donuts are his weakness, but that he has been trying to limit sweets.   Beverages: ~3 cups of black coffee/day, 4-5 water bottles (~40 oz)/day, sugar-free flavored packets   Alcohol Intake: very little per pt. Pt notes he was a heavy drinker in the past.   Food Allergies? NKFAS   Food Intolerances? None   Food Dislikes? Liver, raw onions    Do you eat when you are not  hungry and/or when you feel full? Yes   Do you eat when you are bored/stressed/emotional? Yes, probably bordem per pt.   Current Exercise/Exercise Hx: active at work, especially during the months of summer/spring/fall.   Hours of sleep per night: roughly 5-7.   Work schedule: Monday through Friday, 7am to 3:30pm.  Who is in the household? Himself and his wife   Who does the cooking/grocery shopping? He primarily cooks, they both grocey shop.   What do you want to get out of surgery? Primary goal is to become a healthier individual and reduce the amount of daily medications taken. Secondary goal is to overall lose weight.     Estimated ability to achieve goals: good.     Today's Lesson: Review of Dr. Levi's lifelong rules, calorie counting, food label reading, promoting feelings of satiety, and energy balance.  Diet Goals: Practice the lifelong rules  Exercise Recommendations: Gradually work up to 150 minutes of moderate intensity exercise per week.  Behavior Changes: will be assessed every month  Behavior Goals:  1. Consistently incorporate 3 meals per day.   2. Increase protein intake at meals from lean choices.  3. Continue to practice not drinking with meals.     Plan: Will follow up next month. Will continue to monitor pt's weight, dietary & behavior changes. Pt notes he has his psych evaluation scheduled on 7/8 with Dr. Walker.          Elicia Goddard, RD, LDN  Registered Dietitian, Licensed Dietitian Nutritionist

## 2024-06-25 ENCOUNTER — APPOINTMENT (OUTPATIENT)
Dept: SURGERY | Facility: CLINIC | Age: 59
End: 2024-06-25
Payer: COMMERCIAL

## 2024-06-25 VITALS — BODY MASS INDEX: 43.43 KG/M2 | WEIGHT: 315 LBS

## 2024-06-27 NOTE — PROGRESS NOTES
"HPI   57 yo with Diabetes 2 (dx mid 40's), HTN, Dyslipidemia , CORTEZ on cpap presents for followup. Pt's last A1c-7.1%, 2/26/2024 6.7%. Pt is testing sugars >4 times per day using dexcom. Pt is having low sugars <1 times/week. Pt is struggling following a carb controlled diet and knows reasonable carb allowances. Pt is able to afford their medications.      Taking basaglar 25 units, novolog  <10 units often protein shake, 25 lunch, 25-30 dinner, mounjaro 12.5mg weekly/tolerating, metformin er 500mg (4), shreya 15mg         Pt still interested in wt loss surgery, working with bariatric surgery.      14 day dexcom G7 data: 41% in range, 0% low, pattern: upper 100's overnight, waking mid 100's, upper 100's lunch, similar at dinner, mid 200's post dinner into bedtime.      Pt had workup for low tesosterone and it was normal     -taking atorvastatin 40mg daily had aches, pcp changed to weekly           -taking losartan 100mg, amlodipine 10mg, bisoprolol  bid .        Current Outpatient Medications:     amLODIPine (Norvasc) 10 mg tablet, TAKE 1 TABLET DAILY, Disp: 90 tablet, Rfl: 1    aspirin 81 mg EC tablet, 1 tablet Orally Once a day, Disp: , Rfl:     atorvastatin (Lipitor) 40 mg tablet, Once a week, Disp: , Rfl:     BD Floridalma 2nd Gen Pen Needle 32 gauge x 5/32\" needle, , Disp: , Rfl:     bisoproloL-hydrochlorothiazide (Ziac) 10-6.25 mg tablet, Take 2 tablets by mouth once daily., Disp: 180 tablet, Rfl: 1    blood sugar diagnostic (OneTouch Verio test strips) strip, OneTouch Verio In Vitro Strip  Quantity: 150  Refills: 0      Start : 14-Aug-2018  Active, Disp: , Rfl:     blood-glucose sensor (Dexcom G7 Sensor) device, USE AS DIRECTED EVERY 10 DAYS, Disp: 9 each, Rfl: 1    insulin glargine (Basaglar KwikPen U-100 Insulin) 100 unit/mL (3 mL) pen, , Disp: 50 mL, Rfl: 1    insulin lispro (HumaLOG KwikPen Insulin) 100 unit/mL injection, , Disp: 100 mL, Rfl: 1    losartan (Cozaar) 100 mg tablet, Take 1 tablet (100 mg) by mouth once " daily., Disp: 90 tablet, Rfl: 0    metFORMIN  mg 24 hr tablet, TAKE 4 TABLETS DAILY, Disp: 360 tablet, Rfl: 3    naproxen sodium (Aleve) 220 mg tablet, 1 tab(s) orally once a day (at bedtime)//be careful with this medication...as you are on a blood thinner and it can cause gi upset and or bleeding//discuss this with  your doctor, Disp: , Rfl:     NovoLOG Flexpen U-100 Insulin 100 unit/mL (3 mL) pen, INJECT UP TO 50 UNITS SUBCUTANEOUSLY DAILY AS DIRECTED., Disp: 45 mL, Rfl: 0    pioglitazone (Actos) 15 mg tablet, Take 1 tablet (15 mg) by mouth once daily., Disp: 90 tablet, Rfl: 1    potassium chloride ER (Micro-K) 10 mEq ER capsule, TAKE 1 CAPSULE DAILY, Disp: 90 capsule, Rfl: 1    tirzepatide (Mounjaro) 12.5 mg/0.5 mL pen injector, Inject 12.5 mg under the skin 1 (one) time per week., Disp: 6 mL, Rfl: 1    tirzepatide (Mounjaro) 7.5 mg/0.5 mL pen injector, 187.5 mg., Disp: , Rfl:       Allergies as of 06/28/2024 - Reviewed 06/28/2024   Allergen Reaction Noted    Ace inhibitors Cough 09/15/2023         Review of Systems   Cardiology: Lightheadedness-denies.  Chest pain-denies.  Leg edema-denies.  Palpitations-denies.  Respiratory: Cough-denies. Shortness of breath-denies.  Wheezing-denies.  Gastroenterology: Constipation-denies.  Diarrhea-denies.  Heartburn-denies.  Endocrinology: Cold intolerance-denies.  Heat intolerance-denies.  Sweats-denies.  Neurology: Headache-denies.  Tremor-denies.  Neuropathy in extremities-denies.  Psychology: Low energy-denies.  Irritability-denies.  Sleep disturbances-denies.      /85 (BP Location: Right arm, Patient Position: Sitting)   Pulse 58   Wt 142 kg (313 lb 3.2 oz)   BMI 42.77 kg/m²       Labs:  Lab Results   Component Value Date    WBC 6.4 03/22/2023    NRBC 0 03/22/2023    RBC 4.68 03/22/2023    HGB 14.5 03/22/2023    HCT 44.2 03/22/2023     03/22/2023     Lab Results   Component Value Date    CALCIUM 9.4 02/26/2024    AST 18 03/22/2023    ALKPHOS 98  "03/22/2023    BILITOT 0.5 03/22/2023    PROT 6.4 03/22/2023    ALBUMIN 3.8 03/22/2023    GLOB 2.6 03/22/2023    AGR 1.5 03/22/2023     02/26/2024    K 4.2 02/26/2024     02/26/2024    CO2 27 02/26/2024    ANIONGAP 12 02/26/2024    BUN 25 (H) 02/26/2024    CREATININE 1.31 (H) 02/26/2024    UREACREAUR 13.1 03/22/2023    GLUCOSE 113 (H) 02/26/2024    ALT 19 03/22/2023    EGFR 63 02/26/2024     Lab Results   Component Value Date    CHOL 172 02/26/2024    TRIG 129 02/26/2024    HDL 35.4 02/26/2024    LDLCALC 111 (H) 02/26/2024     Lab Results   Component Value Date    MICROALBCREA 50.4 (H) 02/26/2024     No results found for: \"TSH\"  Lab Results   Component Value Date    NAQXCWND81 380 03/22/2023     Lab Results   Component Value Date    HGBA1C 8.2 (A) 06/28/2024         Physical Exam   General Appearance: pleasant, cooperative, no acute distress  HEENT: no chemosis, no proptosis, no lid lag, no lid retraction  Neck: no lymphadenopathy, no thyromegaly, no dominant thyroid nodules  Heart: no murmurs, regular rate and rhythm, S1 and S2  Lungs: no wheezes, no rhonci, no rales  Extremities: no lower extremity swelling      Assessment/Plan   1. Type 2 diabetes mellitus with hyperglycemia, with long-term current use of insulin (Multi)  -A1c ordered and reviewed  -labs reviewed  -dexcom data reviewed    -increase mounjaro 15mg  -add farxiga 10mg daily, went over risk/benefits/warnings  -consider 30-40 units at dinner with novolog    2. Essential hypertension  -at target on therapy    3. Mixed hyperlipidemia  -on statin that he can tolerate         Follow Up:  lloyd De LeónD 3 months    Medical Decision Making  Complexity of problem: Chronic illness of diabetes mellitus uncontrolled, progressing  Data analyzed and reviewed: Reviewed prior notes, blood glucose data, labs including HgbA1c, lipids, serum chemistries.  Ordered tests.   Risk of complications and morbidities: Is definite because of use of insulin and risk " of hypoglycemia.  Prescription medications reviewed and modifications made.  Compliance assessed.  Addressed social determinants of health including food insecurity.

## 2024-06-28 ENCOUNTER — APPOINTMENT (OUTPATIENT)
Dept: ENDOCRINOLOGY | Facility: CLINIC | Age: 59
End: 2024-06-28
Payer: COMMERCIAL

## 2024-06-28 VITALS
SYSTOLIC BLOOD PRESSURE: 137 MMHG | WEIGHT: 313.2 LBS | BODY MASS INDEX: 42.77 KG/M2 | HEART RATE: 58 BPM | DIASTOLIC BLOOD PRESSURE: 85 MMHG

## 2024-06-28 DIAGNOSIS — I10 ESSENTIAL HYPERTENSION: ICD-10-CM

## 2024-06-28 DIAGNOSIS — Z79.4 TYPE 2 DIABETES MELLITUS WITH HYPERGLYCEMIA, WITH LONG-TERM CURRENT USE OF INSULIN (MULTI): Primary | ICD-10-CM

## 2024-06-28 DIAGNOSIS — E78.2 MIXED HYPERLIPIDEMIA: ICD-10-CM

## 2024-06-28 DIAGNOSIS — E11.65 TYPE 2 DIABETES MELLITUS WITH HYPERGLYCEMIA, WITH LONG-TERM CURRENT USE OF INSULIN (MULTI): Primary | ICD-10-CM

## 2024-06-28 LAB — POC HEMOGLOBIN A1C: 8.2 % (ref 4.2–6.5)

## 2024-06-28 PROCEDURE — 3008F BODY MASS INDEX DOCD: CPT | Performed by: INTERNAL MEDICINE

## 2024-06-28 PROCEDURE — 3044F HG A1C LEVEL LT 7.0%: CPT | Performed by: INTERNAL MEDICINE

## 2024-06-28 PROCEDURE — 4010F ACE/ARB THERAPY RXD/TAKEN: CPT | Performed by: INTERNAL MEDICINE

## 2024-06-28 PROCEDURE — 3075F SYST BP GE 130 - 139MM HG: CPT | Performed by: INTERNAL MEDICINE

## 2024-06-28 PROCEDURE — 83036 HEMOGLOBIN GLYCOSYLATED A1C: CPT | Performed by: INTERNAL MEDICINE

## 2024-06-28 PROCEDURE — 3049F LDL-C 100-129 MG/DL: CPT | Performed by: INTERNAL MEDICINE

## 2024-06-28 PROCEDURE — 4004F PT TOBACCO SCREEN RCVD TLK: CPT | Performed by: INTERNAL MEDICINE

## 2024-06-28 PROCEDURE — 3079F DIAST BP 80-89 MM HG: CPT | Performed by: INTERNAL MEDICINE

## 2024-06-28 PROCEDURE — 99214 OFFICE O/P EST MOD 30 MIN: CPT | Performed by: INTERNAL MEDICINE

## 2024-06-28 PROCEDURE — RXMED WILLOW AMBULATORY MEDICATION CHARGE

## 2024-06-28 PROCEDURE — 3060F POS MICROALBUMINURIA REV: CPT | Performed by: INTERNAL MEDICINE

## 2024-06-28 RX ORDER — DAPAGLIFLOZIN 10 MG/1
10 TABLET, FILM COATED ORAL DAILY
Qty: 90 TABLET | Refills: 1 | Status: SHIPPED | OUTPATIENT
Start: 2024-06-28 | End: 2025-06-28

## 2024-06-28 RX ORDER — TIRZEPATIDE 15 MG/.5ML
15 INJECTION, SOLUTION SUBCUTANEOUS
Qty: 6 ML | Refills: 1 | Status: SHIPPED | OUTPATIENT
Start: 2024-06-30

## 2024-06-28 ASSESSMENT — ENCOUNTER SYMPTOMS: DEPRESSION: 0

## 2024-06-28 ASSESSMENT — PAIN SCALES - GENERAL: PAINLEVEL: 0-NO PAIN

## 2024-07-01 ENCOUNTER — PHARMACY VISIT (OUTPATIENT)
Dept: PHARMACY | Facility: CLINIC | Age: 59
End: 2024-07-01
Payer: COMMERCIAL

## 2024-07-05 ENCOUNTER — HOSPITAL ENCOUNTER (OUTPATIENT)
Dept: RADIOLOGY | Facility: HOSPITAL | Age: 59
Discharge: HOME | End: 2024-07-05
Payer: COMMERCIAL

## 2024-07-05 DIAGNOSIS — K30 DELAYED GASTRIC EMPTYING: ICD-10-CM

## 2024-07-05 PROCEDURE — 78264 GASTRIC EMPTYING IMG STUDY: CPT

## 2024-07-05 PROCEDURE — 3430000001 HC RX 343 DIAGNOSTIC RADIOPHARMACEUTICALS: Performed by: SURGERY

## 2024-07-11 NOTE — PROGRESS NOTES
"HPI:       Hypertension:          The patient has no issues         The patients cardiovascular risk factors include:         Cardiac Risk Factors  Age > 45-male, > 55-female:  YES  +1   Smoking:   NO   Sig. family hx of CHD*:  YES  +1   Hypertension:   YES  +1   Diabetes:   YES  +1   HDL < 35:   YES  +1   HDL > 59:   NO   Total: 5     *- Sig. family h/o CHD per NCEP = MI or sudden death at <54yo in   father or other 1st-degree male relative, or <64yo in mother or   other 1st-degree female relative           The patients adherence to the treatment regimen is Good         Responses to the medications has been Good    Hyperlipidemia:   The patient does not use medications that may worsen dyslipidemias (corticosteroids, progestins, anabolic steroids, diuretics, beta-blockers, amiodarone, cyclosporine, olanzapine). The patient exercises infrequently.  The patient is not known to have coexisting coronary artery disease.    Pt has IDDM and sees endocrinology.  He uses cgm and is on insulin as well as oral meds  MEDICAL HISTORY:   Past Medical History:   Diagnosis Date    Essential hypertension     History of COVID-19 12/2021    History of DVT (deep vein thrombosis)     Hyperlipidemia     Burtch    Hypertension     Iron deficiency anemia     Long term (current) use of insulin (Multi)     Mixed hyperlipidemia     Morbid obesity with BMI of 40.0-44.9, adult (Multi)     CORTEZ (obstructive sleep apnea)     Mendoza/Strausbaugh    Pulmonary fibrosis (Multi)     Mendoza/Strausbaugh    Type 2 diabetes mellitus (Multi)     Burtch    Type 2 diabetes mellitus with hyperglycemia, with long-term current use of insulin (Multi)      MEDICATIONS:   Current Outpatient Medications   Medication Sig Dispense Refill    amLODIPine (Norvasc) 10 mg tablet TAKE 1 TABLET DAILY 90 tablet 1    atorvastatin (Lipitor) 40 mg tablet Once a week      BD Floridalma 2nd Gen Pen Needle 32 gauge x 5/32\" needle       bisoproloL-hydrochlorothiazide (Ziac) 10-6.25 mg tablet " Take 2 tablets by mouth once daily. 180 tablet 1    blood sugar diagnostic (OneTouch Verio test strips) strip OneTouch Verio In Vitro Strip   Quantity: 150  Refills: 0        Start : 14-Aug-2018   Active      blood-glucose sensor (Dexcom G7 Sensor) device USE AS DIRECTED EVERY 10 DAYS 9 each 1    cholecalciferol (Vitamin D3) 1,250 mcg (50,000 unit) tablet Take 1 tablet (50,000 Units) by mouth every 7 days. 12 tablet 3    dapagliflozin propanediol (Farxiga) 10 mg Take 1 tablet (10 mg) by mouth once daily. (Patient taking differently: Take 1 tablet (10 mg) by mouth once daily. Last dose 3 days ago) 90 tablet 1    ferrous sulfate 325 (65 Fe) MG EC tablet Take 1 tablet by mouth once daily with breakfast. Do not crush, chew, or split. (Patient taking differently: Take 1 tablet by mouth once daily with breakfast. Do not crush, chew, or split.  Hs not started inron tablets) 90 tablet 3    insulin glargine (Basaglar KwikPen U-100 Insulin) 100 unit/mL (3 mL) pen  50 mL 1    insulin lispro (HumaLOG KwikPen Insulin) 100 unit/mL injection  (Patient taking differently: 3 times daily (morning, midday, late afternoon). Dose varies between 20-35 depending on meal) 100 mL 1    metFORMIN  mg 24 hr tablet TAKE 4 TABLETS DAILY 360 tablet 3    pioglitazone (Actos) 15 mg tablet TAKE 1 TABLET DAILY 90 tablet 3    potassium chloride ER (Micro-K) 10 mEq ER capsule TAKE 1 CAPSULE DAILY 90 capsule 1    tirzepatide (Mounjaro) 15 mg/0.5 mL pen injector Inject 15 mg under the skin 1 (one) time per week. (Patient taking differently: Inject 15 mg under the skin 1 (one) time per week. Taken 8-4-2024) 6 mL 1    losartan (Cozaar) 100 mg tablet Take 1 tablet (100 mg) by mouth once daily. 90 tablet 1     No current facility-administered medications for this visit.     ALLERGIES:   Allergies   Allergen Reactions    Ace Inhibitors Cough     FAMILY HISTORY:   Family History   Problem Relation Name Age of Onset    Hypothyroidism Mother EJP      Hypertension Mother EJP     Other (MORBID OBESITY) Mother EJP         GASTRIC BYPASS    Heart disease Father KDP         with stent at age 60    Hypertension Father KDP     Other (MORBID OBESITY) Father KDP         SLEEVE    Arthritis Father KDP     Diabetes Father KDP     No Known Problems Sister      No Known Problems Daughter      Other (HTN) Son      Heart disease Maternal Grandmother Eulah     Cardiomyopathy Maternal Grandmother Eulah     Diabetes Paternal Grandmother Urline     Hypertension Paternal Grandmother Urline     Transient ischemic attack Paternal Grandmother Urline     Heart disease Paternal Grandfather Ramos     Heart attack Paternal Grandfather Ramos     Cardiomyopathy Paternal Grandfather Ramos     Stroke Paternal Grandfather Ramos     COPD Paternal Grandfather Ramos      SOCIAL HISTORY:   Social History     Tobacco Use    Smoking status: Former     Current packs/day: 0.00     Average packs/day: 1 pack/day for 23.1 years (23.1 ttl pk-yrs)     Types: Cigarettes, Cigars     Start date: 10/1/1985     Quit date: 11/15/2008     Years since quitting: 15.7     Passive exposure: Current    Smokeless tobacco: Former     Types: Snuff     Quit date: 1/1/1998    Tobacco comments:     Still smoke cigars   Vaping Use    Vaping status: Never Used   Substance Use Topics    Alcohol use: Not Currently    Drug use: Never         ROS:      CONSTITUTION:  alert and oriented     OPHTHALMOLOGY:          no Change in vision.         CARDIOLOGY:          no Chest pain.  no Dyspnea on exertion.  no Shortness of breath.         ENDOCRINOLOGY:          no Excessive thirst.  no Excessive urination.  no Fatigue.  no Skin changes.  no Weight gain.  no Weight loss.         SKIN:          no Healing problems.         NEUROLOGY:          no Loss of sensation in specific body area.  no Burning pain in feet.  no Peripheral neuropathy.  no Tingling/numbness.    LABS:   Lab Results   Component Value Date    GLUCOSE 152 (H) 07/30/2024    CALCIUM  "9.4 07/30/2024     07/30/2024    K 3.9 07/30/2024    CO2 25 07/30/2024     07/30/2024    BUN 24 (H) 07/30/2024    CREATININE 1.59 (H) 07/30/2024     Lab Results   Component Value Date    CHOL 169 07/30/2024    CHOL 172 02/26/2024    CHOL 108 (L) 03/22/2023     Lab Results   Component Value Date    HDL 28.4 07/30/2024    HDL 35.4 02/26/2024    HDL 29 (L) 03/22/2023     Lab Results   Component Value Date    LDLCALC 82 07/30/2024    LDLCALC 111 (H) 02/26/2024    LDLCALC 57 (L) 03/22/2023     Lab Results   Component Value Date    TRIG 292 (H) 07/30/2024    TRIG 129 02/26/2024    TRIG 108 03/22/2023     No components found for: \"CHOLHDL\"   Latest Reference Range & Units 02/26/24 09:21   Albumin, Urine Random Not established mg/L 77.6   Creatinine, Urine Random 20.0 - 370.0 mg/dL 154.0   Albumin/Creatinine Ratio <30.0 ug/mg Creat 50.4 (H)   (H): Data is abnormally high    Lab Results   Component Value Date    HGBA1C 8.2 (A) 06/28/2024          VITAL SIGNS:  /76   Pulse 99   Wt 134 kg (295 lb 4.8 oz)   SpO2 99%   BMI 40.35 kg/m²     General Appearance: awake, alert, oriented, in no acute distress  Lungs: Normal expansion.  Clear to auscultation.  No rales, rhonchi, or wheezing.  Heart: Heart sounds are normal.  Regular rate and rhythm without murmur, gallop or rub.  Extremities: Extremities warm to touch, pink, with no edema.  Neurologic: Alert and oriented x 3, gait normal., reflexes normal and symmetric, strength and  sensation grossly normal    Mickey was seen today for hypertension.  Diagnoses and all orders for this visit:  Essential hypertension (Primary)  Comments:  cont amlodipine/bisoprolol/hctz and potassium  Orders:  -     losartan (Cozaar) 100 mg tablet; Take 1 tablet (100 mg) by mouth once daily.  -     Follow Up In Primary Care - Established; Future  Mixed hyperlipidemia  Comments:  cont atorvastastin  Albuminuria  -     Albumin-Creatinine Ratio, Urine Random; Future  Type 2 diabetes " mellitus with hyperglycemia, with long-term current use of insulin (Multi)  Comments:  cont to f/u with endo: cont mounjaro, farxiga, pioglitazone, metformin, and insulin  Morbid obesity with BMI of 40.0-44.9, adult (Multi)  Need for vaccination  -     Tdap vaccine, age 7 years and older  (BOOSTRIX)

## 2024-07-17 DIAGNOSIS — E11.65 TYPE 2 DIABETES MELLITUS WITH HYPERGLYCEMIA, WITH LONG-TERM CURRENT USE OF INSULIN (MULTI): ICD-10-CM

## 2024-07-17 DIAGNOSIS — Z79.4 TYPE 2 DIABETES MELLITUS WITH HYPERGLYCEMIA, WITH LONG-TERM CURRENT USE OF INSULIN (MULTI): ICD-10-CM

## 2024-07-17 RX ORDER — PIOGLITAZONEHYDROCHLORIDE 15 MG/1
15 TABLET ORAL DAILY
Qty: 90 TABLET | Refills: 3 | Status: SHIPPED | OUTPATIENT
Start: 2024-07-17

## 2024-07-22 ENCOUNTER — PHARMACY VISIT (OUTPATIENT)
Dept: PHARMACY | Facility: CLINIC | Age: 59
End: 2024-07-22
Payer: COMMERCIAL

## 2024-07-22 PROCEDURE — RXMED WILLOW AMBULATORY MEDICATION CHARGE

## 2024-07-23 ENCOUNTER — PHARMACY VISIT (OUTPATIENT)
Dept: PHARMACY | Facility: CLINIC | Age: 59
End: 2024-07-23

## 2024-07-30 ENCOUNTER — LAB (OUTPATIENT)
Dept: LAB | Facility: LAB | Age: 59
End: 2024-07-30
Payer: COMMERCIAL

## 2024-07-30 DIAGNOSIS — E78.2 MIXED HYPERLIPIDEMIA: ICD-10-CM

## 2024-07-30 DIAGNOSIS — N28.9 ABNORMAL KIDNEY FUNCTION: ICD-10-CM

## 2024-07-30 LAB
ANION GAP SERPL CALC-SCNC: 13 MMOL/L (ref 10–20)
BUN SERPL-MCNC: 24 MG/DL (ref 6–23)
CALCIUM SERPL-MCNC: 9.4 MG/DL (ref 8.6–10.3)
CHLORIDE SERPL-SCNC: 102 MMOL/L (ref 98–107)
CHOLEST SERPL-MCNC: 169 MG/DL (ref 0–199)
CHOLESTEROL/HDL RATIO: 6
CO2 SERPL-SCNC: 25 MMOL/L (ref 21–32)
CREAT SERPL-MCNC: 1.59 MG/DL (ref 0.5–1.3)
EGFRCR SERPLBLD CKD-EPI 2021: 50 ML/MIN/1.73M*2
GLUCOSE SERPL-MCNC: 152 MG/DL (ref 74–99)
HDLC SERPL-MCNC: 28.4 MG/DL
LDLC SERPL CALC-MCNC: 82 MG/DL
NON HDL CHOLESTEROL: 141 MG/DL (ref 0–149)
POTASSIUM SERPL-SCNC: 3.9 MMOL/L (ref 3.5–5.3)
SODIUM SERPL-SCNC: 136 MMOL/L (ref 136–145)
TRIGL SERPL-MCNC: 292 MG/DL (ref 0–149)
VLDL: 58 MG/DL (ref 0–40)

## 2024-07-30 PROCEDURE — 36415 COLL VENOUS BLD VENIPUNCTURE: CPT

## 2024-07-31 ENCOUNTER — LAB (OUTPATIENT)
Dept: LAB | Facility: LAB | Age: 59
End: 2024-07-31
Payer: COMMERCIAL

## 2024-07-31 ENCOUNTER — APPOINTMENT (OUTPATIENT)
Dept: SURGERY | Facility: CLINIC | Age: 59
End: 2024-07-31
Payer: COMMERCIAL

## 2024-07-31 VITALS
OXYGEN SATURATION: 97 % | HEIGHT: 72 IN | BODY MASS INDEX: 40.09 KG/M2 | HEART RATE: 62 BPM | WEIGHT: 296 LBS | DIASTOLIC BLOOD PRESSURE: 82 MMHG | SYSTOLIC BLOOD PRESSURE: 122 MMHG

## 2024-07-31 DIAGNOSIS — E63.9 NUTRITIONAL DISORDER: ICD-10-CM

## 2024-07-31 DIAGNOSIS — Z91.89 AT HIGH RISK FOR DEEP VENOUS THROMBOSIS: ICD-10-CM

## 2024-07-31 DIAGNOSIS — E61.0 COPPER DEFICIENCY: ICD-10-CM

## 2024-07-31 DIAGNOSIS — Z79.4 TYPE 2 DIABETES MELLITUS WITH HYPERGLYCEMIA, WITH LONG-TERM CURRENT USE OF INSULIN (MULTI): ICD-10-CM

## 2024-07-31 DIAGNOSIS — R94.31 ABNORMAL EKG: ICD-10-CM

## 2024-07-31 DIAGNOSIS — E53.8 B12 DEFICIENCY: ICD-10-CM

## 2024-07-31 DIAGNOSIS — D50.8 IRON DEFICIENCY ANEMIA SECONDARY TO INADEQUATE DIETARY IRON INTAKE: ICD-10-CM

## 2024-07-31 DIAGNOSIS — G47.33 SEVERE OBSTRUCTIVE SLEEP APNEA: ICD-10-CM

## 2024-07-31 DIAGNOSIS — Z01.818 PRE-OP EVALUATION: ICD-10-CM

## 2024-07-31 DIAGNOSIS — Z91.89 AT RISK FOR DEEP VENOUS THROMBOSIS: ICD-10-CM

## 2024-07-31 DIAGNOSIS — E55.9 VITAMIN D DEFICIENCY: ICD-10-CM

## 2024-07-31 DIAGNOSIS — E66.01 MORBID (SEVERE) OBESITY DUE TO EXCESS CALORIES (MULTI): ICD-10-CM

## 2024-07-31 DIAGNOSIS — D68.9 COAGULATION DISORDER (MULTI): ICD-10-CM

## 2024-07-31 DIAGNOSIS — E11.65 TYPE 2 DIABETES MELLITUS WITH HYPERGLYCEMIA, WITH LONG-TERM CURRENT USE OF INSULIN (MULTI): ICD-10-CM

## 2024-07-31 DIAGNOSIS — E78.2 MIXED HYPERLIPIDEMIA: ICD-10-CM

## 2024-07-31 DIAGNOSIS — Z71.3 ENCOUNTER FOR NUTRITION EVALUATION PRIOR TO BARIATRIC SURGERY: ICD-10-CM

## 2024-07-31 DIAGNOSIS — I10 ESSENTIAL HYPERTENSION: ICD-10-CM

## 2024-07-31 DIAGNOSIS — E51.9 THIAMINE DEFICIENCY: ICD-10-CM

## 2024-07-31 DIAGNOSIS — E07.9 DISEASE OF THYROID GLAND: ICD-10-CM

## 2024-07-31 DIAGNOSIS — I10 ESSENTIAL HYPERTENSION: Primary | ICD-10-CM

## 2024-07-31 LAB
25(OH)D3 SERPL-MCNC: 16 NG/ML (ref 31–100)
ALBUMIN SERPL-MCNC: 4.6 G/DL (ref 3.5–5)
ALP BLD-CCNC: 90 U/L (ref 35–125)
ALT SERPL-CCNC: 16 U/L (ref 5–40)
APTT PPP: 30.2 SECONDS (ref 22–32.5)
AST SERPL-CCNC: 13 U/L (ref 5–40)
BASOPHILS # BLD AUTO: 0.05 X10*3/UL (ref 0–0.1)
BASOPHILS NFR BLD AUTO: 0.7 %
BILIRUB DIRECT SERPL-MCNC: <0.2 MG/DL (ref 0–0.2)
BILIRUB SERPL-MCNC: 0.5 MG/DL (ref 0.1–1.2)
EOSINOPHIL # BLD AUTO: 0.22 X10*3/UL (ref 0–0.7)
EOSINOPHIL NFR BLD AUTO: 3 %
ERYTHROCYTE [DISTWIDTH] IN BLOOD BY AUTOMATED COUNT: 14 % (ref 11.5–14.5)
FERRITIN SERPL-MCNC: 26 NG/ML (ref 30–400)
FOLATE SERPL-MCNC: 12.9 NG/ML (ref 4.2–19.9)
HCT VFR BLD AUTO: 46.6 % (ref 41–52)
HGB BLD-MCNC: 15.2 G/DL (ref 13.5–17.5)
IMM GRANULOCYTES # BLD AUTO: 0.01 X10*3/UL (ref 0–0.7)
IMM GRANULOCYTES NFR BLD AUTO: 0.1 % (ref 0–0.9)
INR PPP: 1 (ref 0.9–1.2)
IRON SATN MFR SERPL: 17 % (ref 12–50)
IRON SERPL-MCNC: 66 UG/DL (ref 45–160)
LYMPHOCYTES # BLD AUTO: 2.41 X10*3/UL (ref 1.2–4.8)
LYMPHOCYTES NFR BLD AUTO: 32.7 %
MCH RBC QN AUTO: 29.1 PG (ref 26–34)
MCHC RBC AUTO-ENTMCNC: 32.6 G/DL (ref 32–36)
MCV RBC AUTO: 89 FL (ref 80–100)
MONOCYTES # BLD AUTO: 0.61 X10*3/UL (ref 0.1–1)
MONOCYTES NFR BLD AUTO: 8.3 %
NEUTROPHILS # BLD AUTO: 4.07 X10*3/UL (ref 1.2–7.7)
NEUTROPHILS NFR BLD AUTO: 55.2 %
NRBC BLD-RTO: 0 /100 WBCS (ref 0–0)
PLATELET # BLD AUTO: 323 X10*3/UL (ref 150–450)
PROT SERPL-MCNC: 7.6 G/DL (ref 5.9–7.9)
PROTHROMBIN TIME: 10.5 SECONDS (ref 9.3–12.7)
PTH-INTACT SERPL-MCNC: 61.9 PG/ML (ref 18.5–88)
RBC # BLD AUTO: 5.23 X10*6/UL (ref 4.5–5.9)
TIBC SERPL-MCNC: 394 UG/DL (ref 228–428)
TSH SERPL DL<=0.05 MIU/L-ACNC: 2.16 MIU/L (ref 0.27–4.2)
UIBC SERPL-MCNC: 328 UG/DL (ref 110–370)
VIT B12 SERPL-MCNC: 635 PG/ML (ref 211–946)
WBC # BLD AUTO: 7.4 X10*3/UL (ref 4.4–11.3)

## 2024-07-31 PROCEDURE — 83970 ASSAY OF PARATHORMONE: CPT

## 2024-07-31 PROCEDURE — 82607 VITAMIN B-12: CPT

## 2024-07-31 PROCEDURE — 84443 ASSAY THYROID STIM HORMONE: CPT

## 2024-07-31 PROCEDURE — 3008F BODY MASS INDEX DOCD: CPT | Performed by: INTERNAL MEDICINE

## 2024-07-31 PROCEDURE — 84590 ASSAY OF VITAMIN A: CPT

## 2024-07-31 PROCEDURE — 83550 IRON BINDING TEST: CPT

## 2024-07-31 PROCEDURE — 85025 COMPLETE CBC W/AUTO DIFF WBC: CPT

## 2024-07-31 PROCEDURE — 3048F LDL-C <100 MG/DL: CPT | Performed by: INTERNAL MEDICINE

## 2024-07-31 PROCEDURE — 82746 ASSAY OF FOLIC ACID SERUM: CPT

## 2024-07-31 PROCEDURE — 3060F POS MICROALBUMINURIA REV: CPT | Performed by: INTERNAL MEDICINE

## 2024-07-31 PROCEDURE — 84425 ASSAY OF VITAMIN B-1: CPT

## 2024-07-31 PROCEDURE — 3074F SYST BP LT 130 MM HG: CPT | Performed by: INTERNAL MEDICINE

## 2024-07-31 PROCEDURE — 93000 ELECTROCARDIOGRAM COMPLETE: CPT | Performed by: INTERNAL MEDICINE

## 2024-07-31 PROCEDURE — 82728 ASSAY OF FERRITIN: CPT

## 2024-07-31 PROCEDURE — 99214 OFFICE O/P EST MOD 30 MIN: CPT | Performed by: INTERNAL MEDICINE

## 2024-07-31 PROCEDURE — 80076 HEPATIC FUNCTION PANEL: CPT

## 2024-07-31 PROCEDURE — 82306 VITAMIN D 25 HYDROXY: CPT

## 2024-07-31 PROCEDURE — 4010F ACE/ARB THERAPY RXD/TAKEN: CPT | Performed by: INTERNAL MEDICINE

## 2024-07-31 PROCEDURE — 84446 ASSAY OF VITAMIN E: CPT

## 2024-07-31 PROCEDURE — 85730 THROMBOPLASTIN TIME PARTIAL: CPT

## 2024-07-31 PROCEDURE — 84630 ASSAY OF ZINC: CPT

## 2024-07-31 PROCEDURE — 83540 ASSAY OF IRON: CPT

## 2024-07-31 PROCEDURE — 3079F DIAST BP 80-89 MM HG: CPT | Performed by: INTERNAL MEDICINE

## 2024-07-31 PROCEDURE — 36415 COLL VENOUS BLD VENIPUNCTURE: CPT

## 2024-07-31 PROCEDURE — 82525 ASSAY OF COPPER: CPT

## 2024-07-31 PROCEDURE — 85610 PROTHROMBIN TIME: CPT

## 2024-07-31 PROCEDURE — 3044F HG A1C LEVEL LT 7.0%: CPT | Performed by: INTERNAL MEDICINE

## 2024-07-31 ASSESSMENT — COLUMBIA-SUICIDE SEVERITY RATING SCALE - C-SSRS
6. HAVE YOU EVER DONE ANYTHING, STARTED TO DO ANYTHING, OR PREPARED TO DO ANYTHING TO END YOUR LIFE?: NO
1. IN THE PAST MONTH, HAVE YOU WISHED YOU WERE DEAD OR WISHED YOU COULD GO TO SLEEP AND NOT WAKE UP?: NO
2. HAVE YOU ACTUALLY HAD ANY THOUGHTS OF KILLING YOURSELF?: NO

## 2024-07-31 ASSESSMENT — PAIN SCALES - GENERAL: PAINLEVEL: 2

## 2024-07-31 NOTE — H&P (VIEW-ONLY)
"Subjective   Patient ID: Mickey Vera is a 59 y.o. male who presents for No chief complaint on file..    HPI Patient has been trying to lose weight for many years by following different diets like Weight Watchers, lost some weight but did not maintain. Currently has 2 meals a day. Breakfast skips.  Lunch consists of salads, sandwiches. For dinner he microwaves or cooks light meals.  . Snacks on trail mix, nuts , fruit. Drinks black coffee, zero sugar flavored water.  . Regarding exercise, he walks a lot for work. Will start walking. He had a blood clot when had pneumonia 1.5 yrs ago, was in the hospital, was on a blood thinner for 6 months. He uses CPAP, machine is 2 yrs old. 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        Excessive daytime somnolence   HENT:  Negative for dental problem.    Respiratory:  Negative for cough and shortness of breath.    Gastrointestinal:  Negative for abdominal pain, constipation, diarrhea and nausea.   Genitourinary:  Negative for difficulty urinating.   Musculoskeletal:  Positive for arthralgias and back pain.   Neurological:  Negative for weakness and headaches.       Objective   /82 (BP Location: Left arm, Patient Position: Sitting)   Pulse 62   Ht 1.822 m (5' 11.75\")   Wt 134 kg (296 lb)   SpO2 97%   BMI 40.43 kg/m²     Physical Exam  Constitutional:       General: He is not in acute distress.     Appearance: He is obese.   HENT:      Mouth/Throat:      Comments: Dentition WNL  Cardiovascular:      Rate and Rhythm: Normal rate and regular rhythm.      Heart sounds: Normal heart sounds.   Pulmonary:      Breath sounds: Normal breath sounds.   Abdominal:      Palpations: Abdomen is soft.      Tenderness: There is no abdominal tenderness.   Musculoskeletal:      Cervical back: No tenderness.      Right lower leg: No edema.      Left lower leg: No edema. "   Lymphadenopathy:      Cervical: No cervical adenopathy.   Skin:     General: Skin is warm.      Findings: No erythema.   Neurological:      Mental Status: He is alert and oriented to person, place, and time.      Gait: Gait is intact. Gait normal.   Psychiatric:         Mood and Affect: Mood normal.         Behavior: Behavior normal.         Assessment/Plan

## 2024-08-02 LAB
COPPER SERPL-MCNC: 139.7 UG/DL (ref 70–140)
ZINC SERPL-MCNC: 78.3 UG/DL (ref 60–120)

## 2024-08-02 RX ORDER — CHOLECALCIFEROL (VITAMIN D3) 1250 MCG
50000 TABLET ORAL
Qty: 12 TABLET | Refills: 3 | Status: SHIPPED | OUTPATIENT
Start: 2024-08-02 | End: 2025-08-02

## 2024-08-02 RX ORDER — ONDANSETRON HYDROCHLORIDE 2 MG/ML
4 INJECTION, SOLUTION INTRAVENOUS ONCE AS NEEDED
Status: CANCELLED | OUTPATIENT
Start: 2024-08-02

## 2024-08-02 RX ORDER — FERROUS SULFATE 325(65) MG
325 TABLET, DELAYED RELEASE (ENTERIC COATED) ORAL
Qty: 90 TABLET | Refills: 3 | Status: SHIPPED | OUTPATIENT
Start: 2024-08-02 | End: 2025-08-02

## 2024-08-02 RX ORDER — NALOXONE HYDROCHLORIDE 0.4 MG/ML
0.2 INJECTION, SOLUTION INTRAMUSCULAR; INTRAVENOUS; SUBCUTANEOUS EVERY 5 MIN PRN
Status: CANCELLED | OUTPATIENT
Start: 2024-08-02

## 2024-08-02 RX ORDER — SODIUM CHLORIDE, SODIUM LACTATE, POTASSIUM CHLORIDE, CALCIUM CHLORIDE 600; 310; 30; 20 MG/100ML; MG/100ML; MG/100ML; MG/100ML
20 INJECTION, SOLUTION INTRAVENOUS CONTINUOUS
Status: CANCELLED | OUTPATIENT
Start: 2024-08-02

## 2024-08-02 RX ORDER — FLUMAZENIL 0.1 MG/ML
0.2 INJECTION INTRAVENOUS ONCE AS NEEDED
Status: CANCELLED | OUTPATIENT
Start: 2024-08-02

## 2024-08-04 LAB
A-TOCOPHEROL VIT E SERPL-MCNC: 11.4 MG/L (ref 5.5–18)
ANNOTATION COMMENT IMP: NORMAL
BETA+GAMMA TOCOPHEROL SERPL-MCNC: 2.7 MG/L (ref 0–6)
RETINYL PALMITATE SERPL-MCNC: 0.04 MG/L (ref 0–0.1)
VIT A SERPL-MCNC: 0.71 MG/L (ref 0.3–1.2)

## 2024-08-05 ASSESSMENT — ENCOUNTER SYMPTOMS
SHORTNESS OF BREATH: 0
DIFFICULTY URINATING: 0
WEAKNESS: 0
ABDOMINAL PAIN: 0
ARTHRALGIAS: 1
BACK PAIN: 1
DIARRHEA: 0
COUGH: 0
FEVER: 0
CHILLS: 0
NAUSEA: 0
CONSTIPATION: 0
HEADACHES: 0

## 2024-08-06 LAB — VIT B1 PYROPHOSHATE BLD-SCNC: 138 NMOL/L (ref 70–180)

## 2024-08-08 ENCOUNTER — TELEPHONE (OUTPATIENT)
Dept: SURGERY | Facility: CLINIC | Age: 59
End: 2024-08-08
Payer: COMMERCIAL

## 2024-08-08 NOTE — TELEPHONE ENCOUNTER
called the office states he is returning a call from a bladimir/ who said they needed to review med list prior to upcoming procedure. please give him a call back I let him know that there is no one with that name at this office but, I would let you know.

## 2024-08-12 ENCOUNTER — OFFICE VISIT (OUTPATIENT)
Dept: CARDIOLOGY | Facility: CLINIC | Age: 59
End: 2024-08-12
Payer: COMMERCIAL

## 2024-08-12 VITALS
HEART RATE: 68 BPM | OXYGEN SATURATION: 98 % | SYSTOLIC BLOOD PRESSURE: 126 MMHG | BODY MASS INDEX: 40.15 KG/M2 | WEIGHT: 294 LBS | DIASTOLIC BLOOD PRESSURE: 74 MMHG

## 2024-08-12 DIAGNOSIS — E61.0 COPPER DEFICIENCY: ICD-10-CM

## 2024-08-12 DIAGNOSIS — E66.01 MORBID (SEVERE) OBESITY DUE TO EXCESS CALORIES (MULTI): ICD-10-CM

## 2024-08-12 DIAGNOSIS — Z91.89 AT HIGH RISK FOR DEEP VENOUS THROMBOSIS: ICD-10-CM

## 2024-08-12 DIAGNOSIS — E55.9 VITAMIN D DEFICIENCY: ICD-10-CM

## 2024-08-12 DIAGNOSIS — Z71.3 ENCOUNTER FOR NUTRITION EVALUATION PRIOR TO BARIATRIC SURGERY: ICD-10-CM

## 2024-08-12 DIAGNOSIS — D68.9 COAGULATION DISORDER (MULTI): ICD-10-CM

## 2024-08-12 DIAGNOSIS — G47.33 SEVERE OBSTRUCTIVE SLEEP APNEA: ICD-10-CM

## 2024-08-12 DIAGNOSIS — D50.8 IRON DEFICIENCY ANEMIA SECONDARY TO INADEQUATE DIETARY IRON INTAKE: ICD-10-CM

## 2024-08-12 DIAGNOSIS — E63.9 NUTRITIONAL DISORDER: ICD-10-CM

## 2024-08-12 DIAGNOSIS — E53.8 B12 DEFICIENCY: ICD-10-CM

## 2024-08-12 DIAGNOSIS — E07.9 DISEASE OF THYROID GLAND: ICD-10-CM

## 2024-08-12 DIAGNOSIS — I10 ESSENTIAL HYPERTENSION: ICD-10-CM

## 2024-08-12 DIAGNOSIS — Z79.4 TYPE 2 DIABETES MELLITUS WITH HYPERGLYCEMIA, WITH LONG-TERM CURRENT USE OF INSULIN (MULTI): ICD-10-CM

## 2024-08-12 DIAGNOSIS — R94.31 ABNORMAL EKG: ICD-10-CM

## 2024-08-12 DIAGNOSIS — E11.65 TYPE 2 DIABETES MELLITUS WITH HYPERGLYCEMIA, WITH LONG-TERM CURRENT USE OF INSULIN (MULTI): ICD-10-CM

## 2024-08-12 DIAGNOSIS — Z91.89 AT RISK FOR DEEP VENOUS THROMBOSIS: ICD-10-CM

## 2024-08-12 DIAGNOSIS — E51.9 THIAMINE DEFICIENCY: ICD-10-CM

## 2024-08-12 DIAGNOSIS — E78.2 MIXED HYPERLIPIDEMIA: ICD-10-CM

## 2024-08-12 PROCEDURE — 3060F POS MICROALBUMINURIA REV: CPT | Performed by: INTERNAL MEDICINE

## 2024-08-12 PROCEDURE — 99204 OFFICE O/P NEW MOD 45 MIN: CPT | Performed by: INTERNAL MEDICINE

## 2024-08-12 PROCEDURE — 3078F DIAST BP <80 MM HG: CPT | Performed by: INTERNAL MEDICINE

## 2024-08-12 PROCEDURE — 99214 OFFICE O/P EST MOD 30 MIN: CPT | Performed by: INTERNAL MEDICINE

## 2024-08-12 PROCEDURE — 3048F LDL-C <100 MG/DL: CPT | Performed by: INTERNAL MEDICINE

## 2024-08-12 PROCEDURE — 3044F HG A1C LEVEL LT 7.0%: CPT | Performed by: INTERNAL MEDICINE

## 2024-08-12 PROCEDURE — 4010F ACE/ARB THERAPY RXD/TAKEN: CPT | Performed by: INTERNAL MEDICINE

## 2024-08-12 PROCEDURE — 3074F SYST BP LT 130 MM HG: CPT | Performed by: INTERNAL MEDICINE

## 2024-08-12 PROCEDURE — 4004F PT TOBACCO SCREEN RCVD TLK: CPT | Performed by: INTERNAL MEDICINE

## 2024-08-12 ASSESSMENT — ENCOUNTER SYMPTOMS
DYSPNEA ON EXERTION: 0
MYALGIAS: 0
LOSS OF SENSATION IN FEET: 0
OCCASIONAL FEELINGS OF UNSTEADINESS: 0
WEIGHT LOSS: 0
FEVER: 0
CLAUDICATION: 0
DIAPHORESIS: 0
PALPITATIONS: 0
ORTHOPNEA: 0
COUGH: 0
SHORTNESS OF BREATH: 0
PND: 0
WEIGHT GAIN: 0
WHEEZING: 0
DIZZINESS: 0
SYNCOPE: 0
NEAR-SYNCOPE: 0
WEAKNESS: 0
IRREGULAR HEARTBEAT: 0
DEPRESSION: 0

## 2024-08-12 ASSESSMENT — PATIENT HEALTH QUESTIONNAIRE - PHQ9
1. LITTLE INTEREST OR PLEASURE IN DOING THINGS: NOT AT ALL
SUM OF ALL RESPONSES TO PHQ9 QUESTIONS 1 AND 2: 0
2. FEELING DOWN, DEPRESSED OR HOPELESS: NOT AT ALL

## 2024-08-12 ASSESSMENT — PAIN SCALES - GENERAL: PAINLEVEL: 0-NO PAIN

## 2024-08-12 ASSESSMENT — COLUMBIA-SUICIDE SEVERITY RATING SCALE - C-SSRS: 1. IN THE PAST MONTH, HAVE YOU WISHED YOU WERE DEAD OR WISHED YOU COULD GO TO SLEEP AND NOT WAKE UP?: NO

## 2024-08-12 NOTE — ASSESSMENT & PLAN NOTE
Nsr with nonspecific IVCD and absence of pathologic Qs in inferior leads. No further workup or intervention in this regard

## 2024-08-12 NOTE — ASSESSMENT & PLAN NOTE
Lipid panel from last month reviewed with him. LDL is adequate continue statin. Triglycerides are elevated, HDL is low. Discussed lifestyle modifications as therapy for this. 150min/week exercise per AHA. Mediterranean and plant based eating

## 2024-08-12 NOTE — PROGRESS NOTES
Subjective      Chief Complaint   Patient presents with    Cardiac Eval-      ABN EKG        60 yo male with h/o HTN and COPD presents for cardiac evaluation. He is considering Bariatric sgy, has an ECG from last month in Dr Tran's office that reads nsr with 1st degree avb and inferior infarct. He has no chest pain or dyspnea. He is not all that active, denies any specific limitations. He has no LE swelling.          Review of Systems   Constitutional: Negative for diaphoresis, fever, weight gain and weight loss.   Eyes:  Negative for visual disturbance.   Cardiovascular:  Negative for chest pain, claudication, dyspnea on exertion, irregular heartbeat, leg swelling, near-syncope, orthopnea, palpitations, paroxysmal nocturnal dyspnea and syncope.   Respiratory:  Negative for cough, shortness of breath and wheezing.    Musculoskeletal:  Negative for muscle weakness and myalgias.   Neurological:  Negative for dizziness and weakness.   All other systems reviewed and are negative.       Past Medical History:   Diagnosis Date    Essential hypertension     History of COVID-19 12/2021    History of DVT (deep vein thrombosis)     Hyperlipidemia     Burtch    Hypertension     Iron deficiency anemia     Long term (current) use of insulin (Multi)     Mixed hyperlipidemia     Morbid obesity with BMI of 40.0-44.9, adult (Multi)     CORTEZ (obstructive sleep apnea)     Mendoza/Strausbaugh    Pulmonary fibrosis (Multi)     Mendoza/Strausbaugh    Type 2 diabetes mellitus (Multi)     Burtch    Type 2 diabetes mellitus with hyperglycemia, with long-term current use of insulin (Multi)         Past Surgical History:   Procedure Laterality Date    ANTERIOR CRUCIATE LIGAMENT REPAIR Right 1984    FINGER SURGERY Right     index finger amputation    HAND SURGERY Left 2014    [facility]arp[facility]    OTHER SURGICAL HISTORY Right 1984    ACL    OTHER SURGICAL HISTORY  07/1965    PYLORIC STENOSIS    RHINOPLASTY  1983        Social History      Socioeconomic History    Marital status:      Spouse name: Jose    Number of children: 2    Years of education: Not on file    Highest education level: Not on file   Occupational History    Not on file   Tobacco Use    Smoking status: Former     Types: Cigars     Passive exposure: Current    Smokeless tobacco: Never   Vaping Use    Vaping status: Never Used   Substance and Sexual Activity    Alcohol use: Not Currently    Drug use: Not Currently    Sexual activity: Defer   Other Topics Concern    Not on file   Social History Narrative    Not on file     Social Determinants of Health     Financial Resource Strain: Not on file   Food Insecurity: Not on file   Transportation Needs: Not on file   Physical Activity: Not on file   Stress: Not on file   Social Connections: Not on file   Intimate Partner Violence: Not on file   Housing Stability: Not on file        Family History   Problem Relation Name Age of Onset    Hypothyroidism Mother      Hypertension Mother      Other (MORBID OBESITY) Mother          GASTRIC BYPASS    Heart disease Father          with stent at age 60    Hypertension Father      Other (MORBID OBESITY) Father          SLEEVE    No Known Problems Sister      No Known Problems Daughter      Other (HTN) Son      Heart disease Maternal Grandmother      Cardiomyopathy Maternal Grandmother      Diabetes Paternal Grandmother      Hypertension Paternal Grandmother      Transient ischemic attack Paternal Grandmother      Heart disease Paternal Grandfather      Heart attack Paternal Grandfather      Cardiomyopathy Paternal Grandfather      Stroke Paternal Grandfather          OBJECTIVE:    Vitals:    08/12/24 1456   BP: 126/74   Pulse: 68   SpO2: 98%        Vitals reviewed.   Constitutional:       Appearance: Normal and healthy appearance. Not in distress.   Pulmonary:      Effort: Pulmonary effort is normal.      Breath sounds: Normal breath sounds.   Cardiovascular:      Normal rate. Regular  rhythm. Normal S1. Normal S2.       Murmurs: There is no murmur.      No gallop.  No click.   Pulses:     Intact distal pulses.   Edema:     Peripheral edema absent.   Skin:     General: Skin is warm and dry.   Neurological:      General: No focal deficit present.          Lab Review:   Lab Results   Component Value Date     07/30/2024    K 3.9 07/30/2024     07/30/2024    CO2 25 07/30/2024    BUN 24 (H) 07/30/2024    CREATININE 1.59 (H) 07/30/2024    GLUCOSE 152 (H) 07/30/2024    CALCIUM 9.4 07/30/2024     Lab Results   Component Value Date    WBC 7.4 07/31/2024    HGB 15.2 07/31/2024    HCT 46.6 07/31/2024    MCV 89 07/31/2024     07/31/2024     Lab Results   Component Value Date    CHOL 169 07/30/2024    TRIG 292 (H) 07/30/2024    HDL 28.4 07/30/2024       Lab Results   Component Value Date    LDLCALC 82 07/30/2024        Essential hypertension  Controlled, continue current medical therapy.    Mixed hyperlipidemia  Lipid panel from last month reviewed with him. LDL is adequate continue statin. Triglycerides are elevated, HDL is low. Discussed lifestyle modifications as therapy for this. 150min/week exercise per AHA. Mediterranean and plant based eating    Abnormal EKG  Nsr with nonspecific IVCD and absence of pathologic Qs in inferior leads. No further workup or intervention in this regard

## 2024-08-15 ENCOUNTER — ANESTHESIA EVENT (OUTPATIENT)
Dept: GASTROENTEROLOGY | Facility: HOSPITAL | Age: 59
End: 2024-08-15
Payer: COMMERCIAL

## 2024-08-15 ENCOUNTER — HOSPITAL ENCOUNTER (OUTPATIENT)
Dept: GASTROENTEROLOGY | Facility: HOSPITAL | Age: 59
Discharge: HOME | End: 2024-08-15
Payer: COMMERCIAL

## 2024-08-15 ENCOUNTER — ANESTHESIA (OUTPATIENT)
Dept: GASTROENTEROLOGY | Facility: HOSPITAL | Age: 59
End: 2024-08-15
Payer: COMMERCIAL

## 2024-08-15 VITALS
DIASTOLIC BLOOD PRESSURE: 85 MMHG | OXYGEN SATURATION: 96 % | HEART RATE: 59 BPM | WEIGHT: 293.21 LBS | BODY MASS INDEX: 39.71 KG/M2 | RESPIRATION RATE: 14 BRPM | SYSTOLIC BLOOD PRESSURE: 117 MMHG | HEIGHT: 72 IN | TEMPERATURE: 97.2 F

## 2024-08-15 DIAGNOSIS — K21.9 GASTRO-ESOPHAGEAL REFLUX DISEASE WITHOUT ESOPHAGITIS: Primary | ICD-10-CM

## 2024-08-15 LAB — GLUCOSE BLD MANUAL STRIP-MCNC: 167 MG/DL (ref 74–99)

## 2024-08-15 PROCEDURE — 3700000002 HC GENERAL ANESTHESIA TIME - EACH INCREMENTAL 1 MINUTE

## 2024-08-15 PROCEDURE — 3700000001 HC GENERAL ANESTHESIA TIME - INITIAL BASE CHARGE

## 2024-08-15 PROCEDURE — 82947 ASSAY GLUCOSE BLOOD QUANT: CPT

## 2024-08-15 PROCEDURE — 7100000009 HC PHASE TWO TIME - INITIAL BASE CHARGE

## 2024-08-15 PROCEDURE — 7100000010 HC PHASE TWO TIME - EACH INCREMENTAL 1 MINUTE

## 2024-08-15 PROCEDURE — 43235 EGD DIAGNOSTIC BRUSH WASH: CPT | Performed by: SURGERY

## 2024-08-15 PROCEDURE — 2500000004 HC RX 250 GENERAL PHARMACY W/ HCPCS (ALT 636 FOR OP/ED): Performed by: ANESTHESIOLOGIST ASSISTANT

## 2024-08-15 PROCEDURE — 7100000001 HC RECOVERY ROOM TIME - INITIAL BASE CHARGE

## 2024-08-15 PROCEDURE — 2500000005 HC RX 250 GENERAL PHARMACY W/O HCPCS: Performed by: ANESTHESIOLOGIST ASSISTANT

## 2024-08-15 PROCEDURE — 7100000002 HC RECOVERY ROOM TIME - EACH INCREMENTAL 1 MINUTE

## 2024-08-15 RX ORDER — ALBUTEROL SULFATE 0.83 MG/ML
2.5 SOLUTION RESPIRATORY (INHALATION) EVERY 30 MIN PRN
Status: ACTIVE | OUTPATIENT
Start: 2024-08-15 | End: 2024-08-15

## 2024-08-15 RX ORDER — LIDOCAINE HYDROCHLORIDE 10 MG/ML
INJECTION INFILTRATION; PERINEURAL AS NEEDED
Status: DISCONTINUED | OUTPATIENT
Start: 2024-08-15 | End: 2024-08-15

## 2024-08-15 RX ORDER — MIDAZOLAM HYDROCHLORIDE 1 MG/ML
INJECTION, SOLUTION INTRAMUSCULAR; INTRAVENOUS AS NEEDED
Status: DISCONTINUED | OUTPATIENT
Start: 2024-08-15 | End: 2024-08-15

## 2024-08-15 RX ORDER — FENTANYL CITRATE 50 UG/ML
50 INJECTION, SOLUTION INTRAMUSCULAR; INTRAVENOUS EVERY 5 MIN PRN
Status: ACTIVE | OUTPATIENT
Start: 2024-08-15 | End: 2024-08-15

## 2024-08-15 RX ORDER — PROPOFOL 10 MG/ML
INJECTION, EMULSION INTRAVENOUS CONTINUOUS PRN
Status: DISCONTINUED | OUTPATIENT
Start: 2024-08-15 | End: 2024-08-15

## 2024-08-15 RX ORDER — LABETALOL HYDROCHLORIDE 5 MG/ML
5 INJECTION, SOLUTION INTRAVENOUS EVERY 5 MIN PRN
Status: ACTIVE | OUTPATIENT
Start: 2024-08-15 | End: 2024-08-15

## 2024-08-15 RX ORDER — SODIUM CHLORIDE, SODIUM LACTATE, POTASSIUM CHLORIDE, CALCIUM CHLORIDE 600; 310; 30; 20 MG/100ML; MG/100ML; MG/100ML; MG/100ML
40 INJECTION, SOLUTION INTRAVENOUS CONTINUOUS
Status: ACTIVE | OUTPATIENT
Start: 2024-08-15 | End: 2024-08-15

## 2024-08-15 RX ORDER — HYDROMORPHONE HYDROCHLORIDE 0.2 MG/ML
0.2 INJECTION INTRAMUSCULAR; INTRAVENOUS; SUBCUTANEOUS EVERY 5 MIN PRN
Status: DISPENSED | OUTPATIENT
Start: 2024-08-15 | End: 2024-08-15

## 2024-08-15 RX ORDER — IPRATROPIUM BROMIDE 0.5 MG/2.5ML
500 SOLUTION RESPIRATORY (INHALATION) EVERY 30 MIN PRN
Status: DISPENSED | OUTPATIENT
Start: 2024-08-15 | End: 2024-08-15

## 2024-08-15 RX ORDER — ONDANSETRON HYDROCHLORIDE 2 MG/ML
4 INJECTION, SOLUTION INTRAVENOUS ONCE AS NEEDED
Status: ACTIVE | OUTPATIENT
Start: 2024-08-15 | End: 2024-08-15

## 2024-08-15 RX ORDER — SODIUM CHLORIDE, SODIUM LACTATE, POTASSIUM CHLORIDE, CALCIUM CHLORIDE 600; 310; 30; 20 MG/100ML; MG/100ML; MG/100ML; MG/100ML
INJECTION, SOLUTION INTRAVENOUS CONTINUOUS PRN
Status: DISCONTINUED | OUTPATIENT
Start: 2024-08-15 | End: 2024-08-15

## 2024-08-15 SDOH — HEALTH STABILITY: MENTAL HEALTH: CURRENT SMOKER: 0

## 2024-08-15 ASSESSMENT — PAIN SCALES - GENERAL
PAINLEVEL_OUTOF10: 0 - NO PAIN

## 2024-08-15 ASSESSMENT — PAIN - FUNCTIONAL ASSESSMENT
PAIN_FUNCTIONAL_ASSESSMENT: 0-10

## 2024-08-15 NOTE — ANESTHESIA PREPROCEDURE EVALUATION
Patient: Mickey Vera    Procedure Information       Date/Time: 08/15/24 1250    Scheduled providers: Elier Levi MD; KINGSTON Osei; Abimael Galindo DO    Procedure: EGD    Location: [facility]          Past Medical History:   Diagnosis Date    Essential hypertension     History of COVID-19 12/2021    History of DVT (deep vein thrombosis)     Hyperlipidemia     Burtch    Hypertension     Iron deficiency anemia     Long term (current) use of insulin (Multi)     Mixed hyperlipidemia     Morbid obesity with BMI of 40.0-44.9, adult (Multi)     CORTEZ (obstructive sleep apnea)     Mendoza/Strausbaugh    Pulmonary fibrosis (Multi)     Mendoza/Strausbaugh    Type 2 diabetes mellitus (Multi)     Burtch    Type 2 diabetes mellitus with hyperglycemia, with long-term current use of insulin (Multi)      Past Surgical History:   Procedure Laterality Date    ANTERIOR CRUCIATE LIGAMENT REPAIR Right 1984    FINGER SURGERY Right     index finger amputation    HAND SURGERY Left 2014    [facility] repair from [facility]    OTHER SURGICAL HISTORY Right 1984    ACL    OTHER SURGICAL HISTORY  07/1965    PYLORIC STENOSIS    RHINOPLASTY  1983       Relevant Problems   Anesthesia (within normal limits)      Cardiac   (+) Abnormal EKG   (+) Essential hypertension   (+) Mixed hyperlipidemia      Pulmonary   (+) Severe obstructive sleep apnea      Endocrine   (+) Type 2 diabetes mellitus with hyperglycemia, with long-term current use of insulin (Multi)      Hematology   (+) Iron deficiency anemia       Clinical information reviewed:                   NPO Detail:  No data recorded     Physical Exam    Airway  Mallampati: II  TM distance: >3 FB  Neck ROM: full     Cardiovascular    Dental    Pulmonary    Abdominal            Anesthesia Plan    History of general anesthesia?: yes  History of complications of general anesthesia?: no    ASA 3     MAC     The patient is not a current smoker.  Patient was not previously instructed  to abstain from smoking on day of procedure.  Patient did not smoke on day of procedure.  Education provided regarding risk of obstructive sleep apnea.  intravenous induction   Postoperative administration of opioids is intended.  Anesthetic plan and risks discussed with patient.  Use of blood products discussed with patient who consented to blood products.    Plan discussed with CRNA and CAA.

## 2024-08-15 NOTE — DISCHARGE INSTRUCTIONS
Instructions  Patient Instructions after AN Upper GI      The anesthetics, sedatives or narcotics which were given to you today will be acting in your body for the next 24 hours, so you might feel a little sleepy or groggy.  This feeling should slowly wear off. Carefully read and follow the instructions.      You received sedation today:  - Do not drive or operate any machinery or power tools of any kind.   - No alcoholic beverages today, not even beer or wine.  - Do not make any important decisions or sign any legal documents.  - No over the counter medications that contain alcohol or that may cause drowsiness.  - Do not make any important decisions or sign any legal documents.     While it is common to experience mild to moderate abdominal distention, gas, or belching after your procedure, if any of these symptoms occur following discharge from the GI Lab or within one week of having your procedure, call the [facility] to be advised whether a visit to your nearest Urgent Care or Emergency Department is indicated.  Take this paper with you if you go.      - If you develop an allergic reaction to the medications that were given during your procedure such as difficulty breathing, rash, hives, severe nausea, vomiting or lightheadedness.- If you experience chest pain, shortness of breath, severe abdominal pain, fevers and chills.     -If you develop signs and symptoms of bleeding such as blood in your spit, if your stools turn black, tarry, or bloody     - If you have not urinated within 8 hours following your procedure.- If your IV site becomes painful, red, inflamed, or looks infected.     If you received a biopsy/polypectomy/sphincterotomy the following instructions apply below:     - Do not use Aspirin containing products, non-steroidal medications or anti-coagulants for one week following your procedure. (Examples of these types of medications are: Advil, Arthrotec, Aleve, Coumadin, Ecotrin,  Heparin, Ibuprofen, Indocin, Motrin, Naprosyn, Nuprin, Plavix, Vioxx, and Voltarin, or their generic forms.  This list is not all-inclusive.  Check with your physician or pharmacist before resuming medications.)      - Eat a soft diet today.  Avoid foods that are poorly digested for the next 24 hours.  These foods would include: nuts, beans, lettuce, red meats, and fried foods.       - Start with liquids and advance your diet as tolerated, gradually work up to eating solids.      - Do not have a Barium Study or Enema for one week.     Your physician recommends the additional following instructions:      Upper GI endoscopy: Resume your previous diet, continue your medications, recommendation to repeat upper endoscopy in three months for follow up of Barton's ablation. Return to normal activity tomorrow.

## 2024-08-15 NOTE — ANESTHESIA POSTPROCEDURE EVALUATION
Patient: Mickey Vera    Procedure Summary       Date: 08/15/24 Room / Location: [facility]    Anesthesia Start: 1304 Anesthesia Stop: 1325    Procedure: EGD Diagnosis: Gastro-esophageal reflux disease without esophagitis    Scheduled Providers: Elier Levi MD; KINGSTON Osei; Abimael Galindo DO Responsible Provider: Abimael Galindo DO    Anesthesia Type: MAC ASA Status: 3            Anesthesia Type: MAC    Vitals Value Taken Time   /78 08/15/24 1330   Temp 36.3 °C (97.3 °F) 08/15/24 1322   Pulse 65 08/15/24 1330   Resp 19 08/15/24 1330   SpO2 95 % 08/15/24 1330       Anesthesia Post Evaluation    Patient location during evaluation: bedside  Patient participation: complete - patient participated  Level of consciousness: awake and alert  Pain management: adequate  Multimodal analgesia pain management approach  Airway patency: patent  Two or more strategies used to mitigate risk of obstructive sleep apnea  Cardiovascular status: acceptable  Respiratory status: acceptable  Hydration status: acceptable  Postoperative Nausea and Vomiting: none        There were no known notable events for this encounter.

## 2024-08-19 ENCOUNTER — LAB (OUTPATIENT)
Dept: LAB | Facility: LAB | Age: 59
End: 2024-08-19
Payer: COMMERCIAL

## 2024-08-19 ENCOUNTER — OFFICE VISIT (OUTPATIENT)
Dept: PRIMARY CARE | Facility: CLINIC | Age: 59
End: 2024-08-19
Payer: COMMERCIAL

## 2024-08-19 VITALS
BODY MASS INDEX: 40.35 KG/M2 | OXYGEN SATURATION: 99 % | DIASTOLIC BLOOD PRESSURE: 76 MMHG | SYSTOLIC BLOOD PRESSURE: 122 MMHG | HEART RATE: 99 BPM | WEIGHT: 295.3 LBS

## 2024-08-19 DIAGNOSIS — R80.9 ALBUMINURIA: ICD-10-CM

## 2024-08-19 DIAGNOSIS — I10 ESSENTIAL HYPERTENSION: Primary | ICD-10-CM

## 2024-08-19 DIAGNOSIS — E66.01 MORBID OBESITY WITH BMI OF 40.0-44.9, ADULT (MULTI): ICD-10-CM

## 2024-08-19 DIAGNOSIS — Z79.4 TYPE 2 DIABETES MELLITUS WITH HYPERGLYCEMIA, WITH LONG-TERM CURRENT USE OF INSULIN (MULTI): ICD-10-CM

## 2024-08-19 DIAGNOSIS — E78.2 MIXED HYPERLIPIDEMIA: ICD-10-CM

## 2024-08-19 DIAGNOSIS — E11.65 TYPE 2 DIABETES MELLITUS WITH HYPERGLYCEMIA, WITH LONG-TERM CURRENT USE OF INSULIN (MULTI): ICD-10-CM

## 2024-08-19 DIAGNOSIS — Z23 NEED FOR VACCINATION: ICD-10-CM

## 2024-08-19 LAB
CREAT UR-MCNC: 96.1 MG/DL (ref 20–370)
MICROALBUMIN UR-MCNC: 25.5 MG/L
MICROALBUMIN/CREAT UR: 26.5 UG/MG CREAT

## 2024-08-19 PROCEDURE — 3048F LDL-C <100 MG/DL: CPT | Performed by: FAMILY MEDICINE

## 2024-08-19 PROCEDURE — 90471 IMMUNIZATION ADMIN: CPT | Performed by: FAMILY MEDICINE

## 2024-08-19 PROCEDURE — 90715 TDAP VACCINE 7 YRS/> IM: CPT | Performed by: FAMILY MEDICINE

## 2024-08-19 PROCEDURE — 3078F DIAST BP <80 MM HG: CPT | Performed by: FAMILY MEDICINE

## 2024-08-19 PROCEDURE — 3074F SYST BP LT 130 MM HG: CPT | Performed by: FAMILY MEDICINE

## 2024-08-19 PROCEDURE — 82043 UR ALBUMIN QUANTITATIVE: CPT

## 2024-08-19 PROCEDURE — 4010F ACE/ARB THERAPY RXD/TAKEN: CPT | Performed by: FAMILY MEDICINE

## 2024-08-19 PROCEDURE — 3060F POS MICROALBUMINURIA REV: CPT | Performed by: FAMILY MEDICINE

## 2024-08-19 PROCEDURE — 1036F TOBACCO NON-USER: CPT | Performed by: FAMILY MEDICINE

## 2024-08-19 PROCEDURE — 3044F HG A1C LEVEL LT 7.0%: CPT | Performed by: FAMILY MEDICINE

## 2024-08-19 PROCEDURE — 99214 OFFICE O/P EST MOD 30 MIN: CPT | Performed by: FAMILY MEDICINE

## 2024-08-19 PROCEDURE — 82570 ASSAY OF URINE CREATININE: CPT

## 2024-08-19 RX ORDER — LOSARTAN POTASSIUM 100 MG/1
100 TABLET ORAL DAILY
Qty: 90 TABLET | Refills: 1 | Status: SHIPPED | OUTPATIENT
Start: 2024-08-19 | End: 2025-02-15

## 2024-08-19 ASSESSMENT — PAIN SCALES - GENERAL: PAINLEVEL: 0-NO PAIN

## 2024-08-20 ENCOUNTER — TELEPHONE (OUTPATIENT)
Dept: ENDOCRINOLOGY | Facility: CLINIC | Age: 59
End: 2024-08-20
Payer: COMMERCIAL

## 2024-08-20 PROCEDURE — RXMED WILLOW AMBULATORY MEDICATION CHARGE

## 2024-08-21 ENCOUNTER — PHARMACY VISIT (OUTPATIENT)
Dept: PHARMACY | Facility: CLINIC | Age: 59
End: 2024-08-21
Payer: COMMERCIAL

## 2024-08-23 PROCEDURE — RXMED WILLOW AMBULATORY MEDICATION CHARGE

## 2024-08-24 ENCOUNTER — PHARMACY VISIT (OUTPATIENT)
Dept: PHARMACY | Facility: CLINIC | Age: 59
End: 2024-08-24
Payer: COMMERCIAL

## 2024-08-26 NOTE — PROGRESS NOTES
Medical Weight Loss Appointment ([facility] 3)    Starting Weight: 311 lbs   Current Weight: 290 lbs / This reflects a 6 lb wt loss x 1 month. Overall down 21 lbs since initial appt.   Current BMI: 39.61     Current Diet: practicing the lifelong rules   Adherence: good.   Daily Intake: 3 meals per day. Mickey is continuing to track calories below 1500 daily.   Breakfast- either a Pure Protein/RX bar, OR a Premier Protein shake   Lunch- usually a salad (examples: west, garden, italian), OR a sandwich (example: chicken salad, smoked turkey with egg)   Dinner- a sandwich with ham salad, OR spaghetti with meatballs (pt notes using lower carb pasta)  Snacks: minimal; sometimes nuts or a protein shake   Beverages: mostly water, sugar-free flavored packets, coffee with protein shake   Exercise: active at work   Behavior/Diet Changes: Mickey is continuing to track and be mindful of overall intake.    Estimated ability to achieve goals: good.     Today's Lesson: Reviewed patient's dietary changes and food recall.   Diet Goals: Practice the lifelong rules  Exercise Recommendations: Gradually work up to 150 minutes of moderate intensity exercise per week.  Behavior Goals:  1. Continue to follow the lifelong rules.     Plan: Provided dietary clearance at today's appointment. Weight checks optional per pt.         Elicia Goddard RD, LDN  Registered Dietitian, Licensed Dietitian Nutritionist

## 2024-08-28 ENCOUNTER — APPOINTMENT (OUTPATIENT)
Dept: SURGERY | Facility: CLINIC | Age: 59
End: 2024-08-28
Payer: COMMERCIAL

## 2024-08-28 VITALS — HEIGHT: 71 IN | WEIGHT: 290 LBS | BODY MASS INDEX: 40.6 KG/M2

## 2024-08-28 VITALS — BODY MASS INDEX: 39.28 KG/M2 | HEIGHT: 72 IN | WEIGHT: 290 LBS

## 2024-08-28 DIAGNOSIS — Z79.4 TYPE 2 DIABETES MELLITUS WITH HYPERGLYCEMIA, WITH LONG-TERM CURRENT USE OF INSULIN (MULTI): Primary | ICD-10-CM

## 2024-08-28 DIAGNOSIS — D50.8 IRON DEFICIENCY ANEMIA SECONDARY TO INADEQUATE DIETARY IRON INTAKE: ICD-10-CM

## 2024-08-28 DIAGNOSIS — I10 ESSENTIAL HYPERTENSION: ICD-10-CM

## 2024-08-28 DIAGNOSIS — E78.2 MIXED HYPERLIPIDEMIA: ICD-10-CM

## 2024-08-28 DIAGNOSIS — E11.65 TYPE 2 DIABETES MELLITUS WITH HYPERGLYCEMIA, WITH LONG-TERM CURRENT USE OF INSULIN (MULTI): Primary | ICD-10-CM

## 2024-08-28 DIAGNOSIS — E66.01 MORBID (SEVERE) OBESITY DUE TO EXCESS CALORIES (MULTI): ICD-10-CM

## 2024-08-28 DIAGNOSIS — E55.9 VITAMIN D DEFICIENCY: ICD-10-CM

## 2024-08-28 PROCEDURE — 4010F ACE/ARB THERAPY RXD/TAKEN: CPT | Performed by: INTERNAL MEDICINE

## 2024-08-28 PROCEDURE — 3044F HG A1C LEVEL LT 7.0%: CPT | Performed by: INTERNAL MEDICINE

## 2024-08-28 PROCEDURE — 3048F LDL-C <100 MG/DL: CPT | Performed by: INTERNAL MEDICINE

## 2024-08-28 PROCEDURE — 3061F NEG MICROALBUMINURIA REV: CPT | Performed by: INTERNAL MEDICINE

## 2024-08-28 PROCEDURE — 99213 OFFICE O/P EST LOW 20 MIN: CPT | Performed by: INTERNAL MEDICINE

## 2024-08-28 PROCEDURE — 3008F BODY MASS INDEX DOCD: CPT | Performed by: INTERNAL MEDICINE

## 2024-08-28 ASSESSMENT — ENCOUNTER SYMPTOMS
HEADACHES: 0
FEVER: 0
ABDOMINAL PAIN: 0
DIFFICULTY URINATING: 0
CHILLS: 0
NAUSEA: 0
BACK PAIN: 1
OCCASIONAL FEELINGS OF UNSTEADINESS: 0
CONSTIPATION: 0
SHORTNESS OF BREATH: 0
WEAKNESS: 0
DEPRESSION: 0
ARTHRALGIAS: 1
LOSS OF SENSATION IN FEET: 0
DIARRHEA: 0
COUGH: 0

## 2024-08-28 ASSESSMENT — PAIN SCALES - GENERAL: PAINLEVEL: 0-NO PAIN

## 2024-08-28 NOTE — PROGRESS NOTES
Subjective   Patient ID: Mickey Vera is a 59 y.o. male who presents for No chief complaint on file..    Currently has 3 meals a day. Regarding protein, averages between 60 and 139g of protein. Lunch consists of salads, sandwiches. For dinner he microwaves or cooks light meals. Doesn't really snack. Drinks black coffee, zero sugar flavored water. Regarding exercise, he walks a lot for work. Has some arthralgias related to walking. He had a blood clot when he had pneumonia 1.5 yrs ago, was in the hospital and was on a blood thinner for 6 months. He uses CPAP, machine which is 2 yrs old. 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 Also checks blood sugars regularly and averages around 154.       Diagnostics Reviewed: 8/15/2024 upper ED revealed sliding hiatal hernia.  2/19/2024 colonoscopy was normal.   Labs Reviewed:         Patient has been trying to lose weight for many years by following different diets like Weight Watchers, lost some weight but did not maintain. Currently has 2 meals a day. Breakfast skips.  Lunch consists of salads, sandwiches. For dinner he microwaves or cooks light meals.  . Snacks on trail mix, nuts , fruit. Drinks black coffee, zero sugar flavored water.  . Regarding exercise, he walks a lot for work. Will start walking. He had a blood clot when had pneumonia 1.5 yrs ago, was in the hospital, was on a blood thinner for 6 months. He uses CPAP, machine is 2 yrs old. 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        Excessive daytime somnolence   HENT:  Negative for dental problem.    Respiratory:  Negative for cough and shortness of breath.    Gastrointestinal:  Negative for abdominal pain, constipation, diarrhea and nausea.   Genitourinary:  Negative for difficulty urinating.   Musculoskeletal:  Positive for arthralgias and back pain.   Neurological:  Negative for  "weakness and headaches.       Objective   Ht 1.81 m (5' 11.25\")   Wt 132 kg (290 lb)   BMI 40.16 kg/m²     Physical Exam    Assessment/Plan     Scribe Attestation  By signing my name below, I, Ronny Macias, Scribe   attest that this documentation has been prepared under the direction and in the presence of Torie Tran MD.           "

## 2024-09-12 ENCOUNTER — TELEPHONE (OUTPATIENT)
Dept: ENDOCRINOLOGY | Facility: CLINIC | Age: 59
End: 2024-09-12
Payer: COMMERCIAL

## 2024-09-16 ENCOUNTER — PHARMACY VISIT (OUTPATIENT)
Dept: PHARMACY | Facility: CLINIC | Age: 59
End: 2024-09-16
Payer: COMMERCIAL

## 2024-09-16 DIAGNOSIS — I10 ESSENTIAL HYPERTENSION: ICD-10-CM

## 2024-09-16 PROCEDURE — RXMED WILLOW AMBULATORY MEDICATION CHARGE

## 2024-09-16 RX ORDER — BISOPROLOL FUMARATE AND HYDROCHLOROTHIAZIDE 10; 6.25 MG/1; MG/1
2 TABLET ORAL DAILY
Qty: 180 TABLET | Refills: 1 | Status: SHIPPED | OUTPATIENT
Start: 2024-09-16

## 2024-09-22 PROCEDURE — RXMED WILLOW AMBULATORY MEDICATION CHARGE

## 2024-09-24 ENCOUNTER — APPOINTMENT (OUTPATIENT)
Dept: SURGERY | Facility: CLINIC | Age: 59
End: 2024-09-24
Payer: COMMERCIAL

## 2024-09-24 ENCOUNTER — PHARMACY VISIT (OUTPATIENT)
Dept: PHARMACY | Facility: CLINIC | Age: 59
End: 2024-09-24
Payer: COMMERCIAL

## 2024-09-24 VITALS — HEIGHT: 72 IN | WEIGHT: 283 LBS | BODY MASS INDEX: 38.33 KG/M2

## 2024-09-24 NOTE — PROGRESS NOTES
Weight Check   Virtual appt.     Current Weight: 283 lbs / This reflects a 7 lb wt loss x 1 month.   Current BMI: 38.65    Daily Intake: 3 meals/day. Mickey is continuing to track calories between 9440-6353 daily.   Breakfast- a protein shake and a coffee with added protein powder, OR a meat omelette  Lunch- a small sandwich, OR a salad, OR chicken strips and 1/2 portion french fries   Dinner- meatballs and a low carb pasta  Snacks: minimal; fruit, mixed nuts, protein shake on occasion   Beverages: water, protein shake, coffee with added protein powder   Exercise: increased daily steps to between 3-5,000.      Elicia Goddard RD, LDN  Registered Dietitian, Licensed Dietitian Nutritionist

## 2024-09-30 ENCOUNTER — APPOINTMENT (OUTPATIENT)
Dept: ENDOCRINOLOGY | Facility: CLINIC | Age: 59
End: 2024-09-30
Payer: COMMERCIAL

## 2024-10-01 ENCOUNTER — APPOINTMENT (OUTPATIENT)
Dept: ENDOCRINOLOGY | Facility: CLINIC | Age: 59
End: 2024-10-01
Payer: COMMERCIAL

## 2024-10-03 ENCOUNTER — APPOINTMENT (OUTPATIENT)
Dept: ENDOCRINOLOGY | Facility: CLINIC | Age: 59
End: 2024-10-03
Payer: COMMERCIAL

## 2024-10-03 VITALS
HEIGHT: 73 IN | SYSTOLIC BLOOD PRESSURE: 108 MMHG | DIASTOLIC BLOOD PRESSURE: 67 MMHG | HEART RATE: 59 BPM | WEIGHT: 283 LBS | BODY MASS INDEX: 37.51 KG/M2

## 2024-10-03 DIAGNOSIS — I10 ESSENTIAL HYPERTENSION: ICD-10-CM

## 2024-10-03 DIAGNOSIS — E11.65 TYPE 2 DIABETES MELLITUS WITH HYPERGLYCEMIA, WITH LONG-TERM CURRENT USE OF INSULIN: Primary | ICD-10-CM

## 2024-10-03 DIAGNOSIS — Z79.4 TYPE 2 DIABETES MELLITUS WITH HYPERGLYCEMIA, WITH LONG-TERM CURRENT USE OF INSULIN: Primary | ICD-10-CM

## 2024-10-03 DIAGNOSIS — E78.2 MIXED HYPERLIPIDEMIA: ICD-10-CM

## 2024-10-03 LAB — POC HEMOGLOBIN A1C: 7.1 % (ref 4.2–6.5)

## 2024-10-03 PROCEDURE — 99214 OFFICE O/P EST MOD 30 MIN: CPT | Performed by: INTERNAL MEDICINE

## 2024-10-03 PROCEDURE — 95251 CONT GLUC MNTR ANALYSIS I&R: CPT | Performed by: INTERNAL MEDICINE

## 2024-10-03 PROCEDURE — 83036 HEMOGLOBIN GLYCOSYLATED A1C: CPT | Performed by: INTERNAL MEDICINE

## 2024-10-03 NOTE — PROGRESS NOTES
Attestation signed by Morgan Starr MD on 10/3/24 at 9:10 AM.    I, Dr Morgan Starr, have reviewed this progress note, medication list, vital signs, any pertinent lab values, and any CGM data if present with the [facility] and  face to face during this visit today. This note reflects the treatment plan that was made under my direction after reviewing the above mentioned elements while face to face with the patient and CDE.  I personally answered and addressed any questions and concerns the patient had during the visit today.  The CDE entered the data in this note under my direction and I personally reviewed it, signed any lab or medication orders that I instructed to be completed. I am the billing provider for this visit and the level of service was determined by my involvement in the Medical Decision Making Component of this visit while face to face with the patient.

## 2024-10-03 NOTE — PATIENT INSTRUCTIONS
Your A1c was 7.1% today - great work!!     Continue all of your diabetes meds as you have been taking them.   - Consider taking a little more Humalog with your meals that tend to cause higher sugars - anywhere from 10-20 units.   - Target sugars in the low 100's BEFORE meals and less than 180 1-2 hours AFTER meals.     For your blood pressure:   - LOWER your bisoprolol/hydrochlorothiazide 10/6.25mg from 2 tabs daily to 1 tab daily for ONE WEEK.  - Then STOP the bisoprolol/hydrochlorothiazide completely.   - Continue the losartan and amlodipine as you have been taking them.     Follow up with Dr. Starr in 6 months.

## 2024-10-03 NOTE — PROGRESS NOTES
"Subjective   Patient ID: Mickey Vera is a 59 y.o. male who presents for Diabetes.  HPI  57 yo with Diabetes 2 (dx mid 40's), HTN, Dyslipidemia , CORTEZ on cpap presents for followup. Last A1c-8.2%, today 7.1%.      Pt is testing sugars >4 times per day using dexcom G7. Pt is having low sugars <1 times/week. Pt is following a carb controlled diet and knows reasonable carb allowances. Reports eating between 1200 and 1500 calories a day - working with bariatric surgery. Pt is able to afford their medications.     Taking basaglar 20 units, humalog 10-15 units with meals (tends to skip if sugars are less than 150), mounjaro 15mg weekly (tolerating and responding well), farxiga 10mg daily (new last visit - tolerating well), metformin er 500mg (4), and shreya 15mg.         Pt still interested in wt loss surgery, working with bariatric surgery.  - reports everything has been approved, just waiting on a surgery date - will discuss further with surgeon.      30 day dexcom G7 data: 62% in range, 0% low, pattern: low 100's overnight, waking low-mid 100's, upper 100's lunch, similar at dinner, occasionally mid 200's post dinner into bedtime.     Pt had workup for low tesosterone and it was normal     Taking atorvastatin 40mg daily had aches, pcp changed to weekly  Taking losartan 100mg, amlodipine 10mg, bisoprolol-hydrochlorothiazide 10-6.25mg two tabs daily for htn.     -labs reviewed - kidney function has been fluctuating, pt is avoiding NSAIDS and taking Tylenol prn for pain.   -recently start iron and vitamin D per Dr. Tran.    Current Outpatient Medications:     amLODIPine (Norvasc) 10 mg tablet, TAKE 1 TABLET DAILY, Disp: 90 tablet, Rfl: 1    atorvastatin (Lipitor) 40 mg tablet, Once a week, Disp: , Rfl:     BD Floridalma 2nd Gen Pen Needle 32 gauge x 5/32\" needle, , Disp: , Rfl:     blood sugar diagnostic (OneTouch Verio test strips) strip, OneTouch Verio In Vitro Strip  Quantity: 150  Refills: 0      Start : 14-Aug-2018  " "Active, Disp: , Rfl:     blood-glucose sensor (Dexcom G7 Sensor) device, USE AS DIRECTED EVERY 10 DAYS, Disp: 9 each, Rfl: 1    cholecalciferol (Vitamin D3) 1,250 mcg (50,000 unit) tablet, Take 1 tablet (50,000 Units) by mouth every 7 days., Disp: 12 tablet, Rfl: 3    dapagliflozin propanediol (Farxiga) 10 mg, Take 1 tablet (10 mg) by mouth once daily. (Patient taking differently: Take 1 tablet (10 mg) by mouth once daily. Last dose 3 days ago), Disp: 90 tablet, Rfl: 1    ferrous sulfate 325 (65 Fe) MG EC tablet, Take 1 tablet by mouth once daily with breakfast. Do not crush, chew, or split. (Patient taking differently: Take 1 tablet by mouth once daily with breakfast. Do not crush, chew, or split. Hs not started inron tablets), Disp: 90 tablet, Rfl: 3    insulin glargine (Basaglar KwikPen U-100 Insulin) 100 unit/mL (3 mL) pen, , Disp: 50 mL, Rfl: 1    insulin lispro (HumaLOG KwikPen Insulin) 100 unit/mL injection, , Disp: 100 mL, Rfl: 1    losartan (Cozaar) 100 mg tablet, Take 1 tablet (100 mg) by mouth once daily., Disp: 90 tablet, Rfl: 1    metFORMIN  mg 24 hr tablet, TAKE 4 TABLETS DAILY, Disp: 360 tablet, Rfl: 3    pioglitazone (Actos) 15 mg tablet, TAKE 1 TABLET DAILY, Disp: 90 tablet, Rfl: 3    potassium chloride ER (Micro-K) 10 mEq ER capsule, TAKE 1 CAPSULE DAILY, Disp: 90 capsule, Rfl: 1    tirzepatide (Mounjaro) 15 mg/0.5 mL pen injector, Inject 15 mg under the skin 1 (one) time per week. (Patient taking differently: Inject 15 mg under the skin 1 (one) time per week. Taken 8-4-2024), Disp: 6 mL, Rfl: 1   Allergies   Allergen Reactions    Ace Inhibitors Cough       Review of Systems  See HPI.     Objective   /67   Pulse 59   Ht 1.854 m (6' 1\")   Wt 128 kg (283 lb)   BMI 37.34 kg/m²     Labs:   Lab Results   Component Value Date    HGBA1C 7.1 (A) 10/03/2024     Lab Results   Component Value Date    CALCIUM 9.4 07/30/2024    AST 13 07/31/2024    ALKPHOS 90 07/31/2024    BILITOT 0.5 07/31/2024 "    PROT 7.6 07/31/2024    ALBUMIN 4.6 07/31/2024    GLOB 2.6 03/22/2023    AGR 1.5 03/22/2023     07/30/2024    K 3.9 07/30/2024     07/30/2024    CO2 25 07/30/2024    ANIONGAP 13 07/30/2024    BUN 24 (H) 07/30/2024    CREATININE 1.59 (H) 07/30/2024    UREACREAUR 13.1 03/22/2023    GLUCOSE 152 (H) 07/30/2024    ALT 16 07/31/2024    EGFR 50 (L) 07/30/2024     Lab Results   Component Value Date    WBC 7.4 07/31/2024    NRBC 0.0 07/31/2024    RBC 5.23 07/31/2024    HGB 15.2 07/31/2024    HCT 46.6 07/31/2024     07/31/2024     Lab Results   Component Value Date    CHOL 169 07/30/2024    TRIG 292 (H) 07/30/2024    HDL 28.4 07/30/2024    LDLCALC 82 07/30/2024     Lab Results   Component Value Date    CREATU 96.1 08/19/2024    MICROALBCREA 26.5 08/19/2024     Lab Results   Component Value Date    TSH 2.16 07/31/2024     Lab Results   Component Value Date    NGTGUPPJ04 635 07/31/2024     Lab Results   Component Value Date    VITD25 16 (L) 07/31/2024     Lab Results   Component Value Date    PTH 61.9 07/31/2024     Lab Results   Component Value Date    MG 1.75 01/01/2022       Assessment    1. Type 2 diabetes mellitus with hyperglycemia, with long-term current use of insulin    2. Essential hypertension    3. Mixed hyperlipidemia        Medical Decision Making  Complexity of problem: Chronic illness of diabetes mellitus uncontrolled, progressing  Data analyzed and reviewed: Reviewed prior notes, blood glucose data, labs including HgbA1c, lipids, serum chemistries.  Ordered tests.   Risk of complications and morbidities: Is definite because of use of insulin and risk of hypoglycemia.  Prescription medications reviewed and modifications made.  Compliance assessed.  Addressed social determinants of health including food insecurity.    Plan   1. Type 2 diabetes mellitus with hyperglycemia, with long-term current use of insulin  - POCT glycosylated hemoglobin (Hb A1C) manually resulted    -A1c ordered and  reviewed.   -CGM data downloaded and reviewed.   -Labs reviewed.     -Overall great glucose control compared to last visit - still spiking with some meals, will adjust Humalog accordingly to target sugars in the low 100's before meals and less than 180 one or two hours after meals (around 10-20 units).   -Great response with Mounjaro in terms of weight loss and glucose control - pt to discuss wt loss surgery further with bariatric team; feels weight loss is starting slow down a bit.   -Continue all meds as ordered right now - will continue to cut back on meds as pt continues to lose weight.     2. Essential hypertension  -BP at target today, running on the lower end.  -Given ongoing weight loss and planned bariatric surgery, can start to cut back on BP meds and monitor closely.   -Will lower bisoprolol/hydrochlorothiazide to 1 tab daily for a week, then STOP completely.   -Continue amlodipine and losartan for now - consider lowering amlodipine in the future if needed.      3. Mixed hyperlipidemia  -On statin and tolerating.   -Continue current therapy.       -labs/tests/notes reviewed  -reviewed and counseled patient on medication monitoring and side effects    Follow Up: 6 months BB    Treatment and plan discussed with Dr. Morgan Starr.   FORD Blank, PharmD, BCACP, CDCES.

## 2024-10-09 ENCOUNTER — TELEPHONE (OUTPATIENT)
Dept: SURGERY | Facility: CLINIC | Age: 59
End: 2024-10-09
Payer: COMMERCIAL

## 2024-10-09 NOTE — TELEPHONE ENCOUNTER
SPOKE WITH PATIENT HE RECEIVED A LETTER FROM HIS INSURANCE. WOULD LIKE TO DISCUSS WITH PATIENT COORDINATOR

## 2024-10-10 ENCOUNTER — TELEPHONE (OUTPATIENT)
Dept: SURGERY | Facility: CLINIC | Age: 59
End: 2024-10-10
Payer: COMMERCIAL

## 2024-10-15 DIAGNOSIS — I10 ESSENTIAL HYPERTENSION: ICD-10-CM

## 2024-10-15 RX ORDER — AMLODIPINE BESYLATE 10 MG/1
10 TABLET ORAL DAILY
Qty: 90 TABLET | Refills: 0 | Status: SHIPPED | OUTPATIENT
Start: 2024-10-15

## 2024-10-15 RX ORDER — POTASSIUM CHLORIDE 750 MG/1
10 CAPSULE, EXTENDED RELEASE ORAL DAILY
Qty: 90 CAPSULE | Refills: 0 | Status: SHIPPED | OUTPATIENT
Start: 2024-10-15

## 2024-10-17 ENCOUNTER — TELEPHONE (OUTPATIENT)
Dept: SURGERY | Facility: CLINIC | Age: 59
End: 2024-10-17
Payer: COMMERCIAL

## 2024-10-17 NOTE — TELEPHONE ENCOUNTER
Called office request return call from ,  made aware. Patient notified that the  will give patient a call back.  Patient states understanding and agrees. Best phone number to reach patient is [phone].

## 2024-10-24 PROCEDURE — RXMED WILLOW AMBULATORY MEDICATION CHARGE

## 2024-10-26 ENCOUNTER — PHARMACY VISIT (OUTPATIENT)
Dept: PHARMACY | Facility: CLINIC | Age: 59
End: 2024-10-26
Payer: COMMERCIAL

## 2024-10-28 ENCOUNTER — PHARMACY VISIT (OUTPATIENT)
Dept: PHARMACY | Facility: CLINIC | Age: 59
End: 2024-10-28

## 2024-11-04 ENCOUNTER — APPOINTMENT (OUTPATIENT)
Dept: SLEEP MEDICINE | Facility: CLINIC | Age: 59
End: 2024-11-04
Payer: COMMERCIAL

## 2024-11-04 VITALS
HEART RATE: 76 BPM | WEIGHT: 281 LBS | SYSTOLIC BLOOD PRESSURE: 140 MMHG | BODY MASS INDEX: 37.24 KG/M2 | HEIGHT: 73 IN | OXYGEN SATURATION: 98 % | DIASTOLIC BLOOD PRESSURE: 88 MMHG

## 2024-11-04 DIAGNOSIS — E66.09 CLASS 2 OBESITY DUE TO EXCESS CALORIES WITH BODY MASS INDEX (BMI) OF 37.0 TO 37.9 IN ADULT, UNSPECIFIED WHETHER SERIOUS COMORBIDITY PRESENT: ICD-10-CM

## 2024-11-04 DIAGNOSIS — J84.10 PULMONARY FIBROSIS (MULTI): ICD-10-CM

## 2024-11-04 DIAGNOSIS — Z98.84 BARIATRIC SURGERY STATUS: ICD-10-CM

## 2024-11-04 DIAGNOSIS — G47.33 OSA (OBSTRUCTIVE SLEEP APNEA): Primary | ICD-10-CM

## 2024-11-04 DIAGNOSIS — E66.812 CLASS 2 OBESITY DUE TO EXCESS CALORIES WITH BODY MASS INDEX (BMI) OF 37.0 TO 37.9 IN ADULT, UNSPECIFIED WHETHER SERIOUS COMORBIDITY PRESENT: ICD-10-CM

## 2024-11-04 PROCEDURE — 3077F SYST BP >= 140 MM HG: CPT | Performed by: PSYCHIATRY & NEUROLOGY

## 2024-11-04 PROCEDURE — 3079F DIAST BP 80-89 MM HG: CPT | Performed by: PSYCHIATRY & NEUROLOGY

## 2024-11-04 PROCEDURE — 3061F NEG MICROALBUMINURIA REV: CPT | Performed by: PSYCHIATRY & NEUROLOGY

## 2024-11-04 PROCEDURE — 3048F LDL-C <100 MG/DL: CPT | Performed by: PSYCHIATRY & NEUROLOGY

## 2024-11-04 PROCEDURE — 3008F BODY MASS INDEX DOCD: CPT | Performed by: PSYCHIATRY & NEUROLOGY

## 2024-11-04 PROCEDURE — 3044F HG A1C LEVEL LT 7.0%: CPT | Performed by: PSYCHIATRY & NEUROLOGY

## 2024-11-04 PROCEDURE — 4010F ACE/ARB THERAPY RXD/TAKEN: CPT | Performed by: PSYCHIATRY & NEUROLOGY

## 2024-11-04 PROCEDURE — 99203 OFFICE O/P NEW LOW 30 MIN: CPT | Performed by: PSYCHIATRY & NEUROLOGY

## 2024-11-04 NOTE — PROGRESS NOTES
Patient: Mickey Vera    [id number]  : 1965 -- AGE 59 y.o.    Provider: Pepe Edwards MD     [facility]   Service Date: 2024              [facility]  Follow-up Note          HPI: Mickey Vera is a 59 y.o. male with severe CORTEZ on CPAP. He is here today for a follow up visit.     Today, he states he is going through the bariatric surgery process and is looking for a letter of support for his insurance company because they don't cover weight loss surgery . He as been losing weight through diet.     Goal is to come off of medications and come off CPAP. Both of his parents underwent bariatric surgery 5 years ago and have been able to come off of medications.    Using CPAP nightly. Does not want to go a night without it.    He is down approximately 50 lbs since his last visit for sleep apnea follow- up - when he was last seen on 23. He has been on Mounjaro for the last year.    He needs new CPAP supplies.    PAP DEVICE   Type: CPAP  Settin-16 cm H2O, EPR 3  Finding benefit: yes - gets better rest, better quality sleep, better daytime alertness/energy.  MASK  Type: Nasal pillow  Fit: good  Last changed: pt states he is due for supplies  Patient is keeping the equipment clean (but could be more diligent about this) and supplies are being renewed at appropriate intervals.     Prior Sleep studies:   -PSG 10/10/2018: wt 145 kg, BMI 42.37. Severe CORTEZ - AHI 72, REM AHI 66, supine AHI 95 - there were 116 obstructive apneas, 2 mixed apneas, 3 central apneas, and 314 hypopneas. SpO2 alis 79% with 51.9 min <90%.   -CPAP titration 2018: titrated 6-11 cm H2O. Overall AHI 9.5. AHI 6.1 during the 263 min spent at 11 cm H2O, SpO2 alis 89%, but there were persistent hypopneas during supine REM at this setting.            SLEEP HABITS   Bed time: 1030-11pm  Sleep onset latency: quickly  Number of awakenings: 1-2x/night to urinate  Final awakenin  am    NIGHTTIME SYMPTOMS:   Ongoing Snoring: no  Mouth Breathing: no  Nocturnal Gasping: no  Nocturnal Choking: no  Pressure intolerance: no  Air hunger: no  Aerophagia: no    DAYTIME SYMPTOMS:  Daytime sleepiness: none  Napping: no  Dozing: occasionally  Feeling sleepy while driving: no  Fallen asleep while driving:       REVIEW OF MACHINE DOWNLOAD:   Date Range: 10/2/24-10/31/24  % Days used: 100%  % Days with Usage >= 4 hrs: 100%  Average usage days used: 7 h 37 min   Settin-16 cmH2O, EPR 3  95th percentile pressure: 11.9 cmH2O, median pressure 11.1 cmH2O, max pressure 12.6 cmH2O  95th percentile leak: 18 LPM, median leak 0.6 LPM  Residual AHI: 1.2    Patient Active Problem List   Diagnosis    Essential hypertension    Iron deficiency anemia    Mixed hyperlipidemia    Severe obstructive sleep apnea    Shortness of breath    Type 2 diabetes mellitus with hyperglycemia, with long-term current use of insulin    Long term (current) use of insulin (Multi)    Morbid obesity with BMI of 40.0-44.9, adult (Multi)    Pulmonary fibrosis (Multi)    At risk for deep venous thrombosis    Abnormal EKG     Past Medical History:   Diagnosis Date    Essential hypertension     History of COVID-19 2021    History of DVT (deep vein thrombosis)     Hyperlipidemia     Burtch    Hypertension     Iron deficiency anemia     Long term (current) use of insulin (Multi)     Mixed hyperlipidemia     Morbid obesity with BMI of 40.0-44.9, adult (Multi)     CORTEZ (obstructive sleep apnea)     Mendoza/Strausbaugh    Pulmonary fibrosis (Multi)     Mendoza/Strausbaugh    Type 2 diabetes mellitus (Multi)     Burtch    Type 2 diabetes mellitus with hyperglycemia, with long-term current use of insulin (Multi)      Past Surgical History:   Procedure Laterality Date    ANTERIOR CRUCIATE LIGAMENT REPAIR Right     ESOPHAGOGASTRODUODENOSCOPY  2024    FINGER SURGERY Right     index finger amputation    HAND SURGERY Left     metacarpal repair from  "[facility]    OTHER SURGICAL HISTORY Right 1984    ACL    OTHER SURGICAL HISTORY  07/1965    PYLORIC STENOSIS    RHINOPLASTY  1983     Current Outpatient Medications on File Prior to Visit   Medication Sig Dispense Refill    amLODIPine (Norvasc) 10 mg tablet TAKE 1 TABLET DAILY 90 tablet 0    atorvastatin (Lipitor) 40 mg tablet Once a week      BD Floridalma 2nd Gen Pen Needle 32 gauge x 5/32\" needle       blood sugar diagnostic (OneTouch Verio test strips) strip OneTouch Verio In Vitro Strip   Quantity: 150  Refills: 0        Start : 14-Aug-2018   Active      blood-glucose sensor (Dexcom G7 Sensor) device USE AS DIRECTED EVERY 10 DAYS 9 each 1    cholecalciferol (Vitamin D3) 1,250 mcg (50,000 unit) tablet Take 1 tablet (50,000 Units) by mouth every 7 days. 12 tablet 3    dapagliflozin propanediol (Farxiga) 10 mg Take 1 tablet (10 mg) by mouth once daily. 90 tablet 1    ferrous sulfate 325 (65 Fe) MG EC tablet Take 1 tablet by mouth once daily with breakfast. Do not crush, chew, or split. 90 tablet 3    insulin glargine (Basaglar KwikPen U-100 Insulin) 100 unit/mL (3 mL) pen  (Patient taking differently: Inject 25-30 Units under the skin once daily at bedtime. Taking 15-20 u) 50 mL 1    insulin lispro (HumaLOG KwikPen Insulin) 100 unit/mL injection  100 mL 1    losartan (Cozaar) 100 mg tablet Take 1 tablet (100 mg) by mouth once daily. 90 tablet 1    metFORMIN  mg 24 hr tablet TAKE 4 TABLETS DAILY (Patient taking differently: Take 2 tablets (1,000 mg) by mouth 2 times daily (morning and late afternoon). TAKE 4 TABLETS DAILY) 360 tablet 3    pioglitazone (Actos) 15 mg tablet TAKE 1 TABLET DAILY 90 tablet 3    potassium chloride ER (Micro-K) 10 mEq ER capsule TAKE 1 CAPSULE DAILY 90 capsule 0    tirzepatide (Mounjaro) 15 mg/0.5 mL pen injector Inject 15 mg under the skin 1 (one) time per week. 6 mL 1     No current facility-administered medications on file prior to visit.       PHYSICAL EXAMINATION:   Vitals:    11/04/24 " "0804   BP: 140/88   BP Location: Left arm   Pulse: 76   SpO2: 98%   Weight: 127 kg (281 lb)   Height: 1.854 m (6' 1\")     Body mass index is 37.07 kg/m².  General: Awake. Alert. Comfortable. No apparent distress.   Speech: Normal.  Comprehension: Normal.  Mood: Stable.  Affect: Appropriate.  Pul:         Normal respiratory effort.   Abd:         Obese    Neuro: Alert, well-oriented. Cranial nerves II-XII grossly normal and symmetric.  Moves all limbs symmetrically with no evidence of significant focal weakness. No abnormal movements noted. Normal gait      ASSESSMENT AND PLAN: Mr. Mickey Vera is a 59 y.o. male with severe CORTEZ on CPAP going through the bariatric surgery process, in need of a letter of support for medical necessity.      #CORTEZ - Patient's sleep apnea is under very good control with CPAP, he is deriving significant subjective benefit from treatment, his compliance is excellent, and mask leak is well controlled overall.  -continue current CPAP settings  -Rx to DME for new supplies    #Obesity, class 2  #bariatric surgery status  -Weight loss efforts were encouraged.  -letter of medical necessity for bariatric surgery for pt - provided today  -continue current PAP settings  - We discussed the importance of PAP therapy in the perioperative period, as well as expectations for possible changes in therapy with weight loss.   - Due to CORTEZ and morbid obesity, patient may be at higher but not prohibitive risk of post-operative respiratory complications.   - Patient is currently optimized on PAP therapy for sleep apnea as long as patient is compliant with PAP use per most recent download.  - May go ahead with contemplated surgery at this time of examination with the following recommendations:           1. Notify Anesthesia of patient's Sleep Apnea Diagnosis & Therapy.           2. If intubation is required, additional efforts, such as fiberoptic guidance may be required.           3. Use PAP preOp and postOp " as soon as able. Patient was told to bring PAP machine and all other equipment to surgery.          4. Prefer recovery in a monitored setting like sleep apnea bed, SICU, or surgical step-down due to increased risk of complications related to sleep apnea.          5. Minimize narcotics and avoid benzodiazepines as much as possible since these medications can worsen sleep apnea.          6. Elevate the Head of bed after surgery at about 30 degrees or higher whichever is comfortable to patient.           7. Aggressive pulmonary toilet (Incentive spirometry, early ambulation) preOp and postOp           8. If patient developed perioperative bronchospasm, give Albuterol w/ or w/o Atrovent via nebulization as needed.          9. Pulmonary Service consult post-operatively, if indicated.          10. DVT prophylaxis  - This patient will require CPAP post-op and upon discharge for a period of time.   - Recommend follow-up with me in 6 months after surgery.  - When the patient has reached a new low plateau weight, they should be reassessed as to whether significant Sleep apnea persists and whether CPAP is still required. Therefore, at that time, a repeat Split-Night PSG should be performed after 2 weeks off CPAP. Then the CPAP can either be discontinued or set to the new lower pressure level. Also, CPAP alternatives could be discussed.    #pulmonary fibrosis - pt asks if repeat imaging is needed per NASREEN Mendoza's last note  -referral to pulmonology for management/follow-up    All of the above was discussed with the patient in detail. He voiced an understanding of the above and was agreeable to proceed further as advised.     Around 30 minutes were spent with the patient plus time spent reviewing the chart, updating the chart as needed, and documenting.     FOLLOW UP: 6 months

## 2024-11-05 ENCOUNTER — TELEPHONE (OUTPATIENT)
Dept: PRIMARY CARE | Facility: CLINIC | Age: 59
End: 2024-11-05
Payer: COMMERCIAL

## 2024-11-05 NOTE — LETTER
November 5, 2024         Patient: Mickey Vera   YOB: 1965   Date of Visit: 11/5/2024       To Whom IT May Concern,    My patient is wishing to have bariatric surgery but that it isn't covered by his insurance.    He is an ideal candidate: he is obese with a BMI of over 37, has insulin dependent diabetes, hypertension, hyperlipidemia, obstructive sleep apnea, and pulmonary fibrosis.  Weight loss from bariatric surgery would or could resolve a number of the above chronic medical problems.    Please reconsider your decision and allow him to have the surgery.      Thank you for your consideration.    Sincerely,      Renate Daugherty MD  [facility]

## 2024-11-05 NOTE — TELEPHONE ENCOUNTER
Patient has been denied bariatric surgery due to weight loss surgery not being covered.  Patient is requesting a letter which states the reason the surgery would be beneficial.  Pt last seen 8/19.  Would you like to see him?

## 2024-11-06 ENCOUNTER — OFFICE VISIT (OUTPATIENT)
Dept: PULMONOLOGY | Facility: CLINIC | Age: 59
End: 2024-11-06
Payer: COMMERCIAL

## 2024-11-06 VITALS
WEIGHT: 280.2 LBS | HEART RATE: 74 BPM | OXYGEN SATURATION: 96 % | DIASTOLIC BLOOD PRESSURE: 70 MMHG | BODY MASS INDEX: 36.97 KG/M2 | SYSTOLIC BLOOD PRESSURE: 125 MMHG

## 2024-11-06 DIAGNOSIS — J84.10 PULMONARY FIBROSIS (MULTI): ICD-10-CM

## 2024-11-06 DIAGNOSIS — J98.4 RESTRICTIVE LUNG DISEASE: Primary | ICD-10-CM

## 2024-11-06 DIAGNOSIS — Z98.84 BARIATRIC SURGERY STATUS: ICD-10-CM

## 2024-11-06 PROCEDURE — 3044F HG A1C LEVEL LT 7.0%: CPT | Performed by: PEDIATRICS

## 2024-11-06 PROCEDURE — 3048F LDL-C <100 MG/DL: CPT | Performed by: PEDIATRICS

## 2024-11-06 PROCEDURE — 3074F SYST BP LT 130 MM HG: CPT | Performed by: PEDIATRICS

## 2024-11-06 PROCEDURE — 4010F ACE/ARB THERAPY RXD/TAKEN: CPT | Performed by: PEDIATRICS

## 2024-11-06 PROCEDURE — 3061F NEG MICROALBUMINURIA REV: CPT | Performed by: PEDIATRICS

## 2024-11-06 PROCEDURE — 1036F TOBACCO NON-USER: CPT | Performed by: PEDIATRICS

## 2024-11-06 PROCEDURE — 3078F DIAST BP <80 MM HG: CPT | Performed by: PEDIATRICS

## 2024-11-06 PROCEDURE — 99215 OFFICE O/P EST HI 40 MIN: CPT | Performed by: PEDIATRICS

## 2024-11-06 NOTE — PROGRESS NOTES
Subjective   Patient ID: Mickey Vera is a 59 y.o. male who presents for ?pulmonary fibrosis    HPI    3/22/19: 53-year-old  male with sleep apnea here with his CPAP. He is doing well with his CPAP. He is using the CPAP every night for 7 hours and his AHI is 1.4. During his study positive pressure was initiated at 6 and increased for respiratory events an optimal pressure could not be determined. He will need an auto uat 11-15 cm H2O pressure with a dream wear nasal mask with medium frame and medium wide nasal cushion with C-Flex 3. Sleep study from 10/10/18 shows an AHI of 72.1. The REM AHI was 66.3. The supine AHI was 95.2. The O2 desaturation index was 91. The SPO2 alis was 79%. Has some symptoms of restless leg syndrome but also has a history of leg pain as well as shoulder pain. Which makes him move around at night. Both his parents have sleep apnea. Comorbidities include diabetes, hypertension and hyperlipidemia. He denies shortness of breath, coughing or wheezing. No nasal complaints. He was a smoker smoking 1 pack per day but quit over 8 years ago. He is an [employer]  and now works for [employer]. 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 [place] is 6/24      01/09/2020: Since his last visit he is doing a fantastic job with his CPAP machine. He uses it every night for 7-1/2 hours. 95th percentile pressure is 13.5. AHI 0.9. He does need new equipment for his machine.     01/14/2021: He is here today with his CPAP for follow-up. He uses his CPAP every night for a little more than 7 hours his AHI is 1.0. His 90th percentile pressure is 13. He feels rested with sleep. He does a fantastic job with his CPAP machine. He does need new supplies today.     01/13/2022 he is being seen today for follow-up. Unfortunately patient had COVID-pneumonia and was hospitalized in December. He was sent home on 2 L of oxygen but has since weaned himself off oxygen checking his  oxygen regularly at home he remains in the middle 90s. He tells me he feels much better now he actually went back to work this week. He is taking it easy. He would like to get rid of his home oxygen. I told him we can do a nocturnal pulse oximetry study on his CPAP to make sure he no longer needs it and then we can discontinue his oxygen. While he was hospitalized there was concern that he might have COPD. He was a smoker he smoked a pack a day for about 25 years. I will order a chest CT for follow-up from the COVID-pneumonia and a PFT but I do not want these done until the end of March. He he had a DVT and currently is on Xarelto. He has already followed up with his PCP.     04/13/2022: He is here today for follow-up. He did have a pulmonary function test shows some restriction which does correct for low alveolar volumes. FEV1 is 78%. He did quit smoking 13 years ago. We did a chest CT which does show much improvement in the groundglass opacities after having COVID. He does have some what looks to be fibrosis in the lower lobes which we will keep an eye on. I suggest he have a new chest CT in 1 year     04/12/23 he is here today for follow-up. He is doing great with his CPAP he uses it every night for 7 hours AHI is 1. CPAP is set from 11-16. 90th percentile pressure is 13. We will look next year and see if he is at the 5-year gloria yet where we can order him a new CPAP. He is now finally getting the right supplies. I did a chest CT on him for some post-COVID fibrosis. It does show improvement from previous. He does have some small pulmonary nodules. We do another chest CT in 1 year as patient has a history of smoking. He did quit 9 or 10 years ago but still smokes cigars on occasion. 10 minutes spent preparing patient's chart. 20 minutes spent with patient answering questions and determining care. 10 minutes spent charting and ordering tests and medications    11/6/2024:  Mr Vera is here for follow up of post  covid pulmonary fibrosis as well as bariatric surgery pulmonary clearance.  He is feeling well from a pulmonary standpoint.  He no longer gets short of breath.  He has lost about 50lbs since his last visit.      Review of Systems    See scanned documents attached to this note for review of systems, and appropriate scales/scores for this visit.     Objective   Physical Exam  Constitutional:       Appearance: Normal appearance.   HENT:      Head: Normocephalic and atraumatic.      Mouth/Throat:      Pharynx: Oropharynx is clear.   Cardiovascular:      Rate and Rhythm: Normal rate and regular rhythm.      Pulses: Normal pulses.      Heart sounds: Normal heart sounds.   Pulmonary:      Effort: Pulmonary effort is normal.      Breath sounds: Normal breath sounds. No wheezing, rhonchi or rales.   Abdominal:      General: Bowel sounds are normal.      Palpations: Abdomen is soft.   Musculoskeletal:         General: Normal range of motion.   Skin:     General: Skin is warm and dry.   Neurological:      General: No focal deficit present.      Mental Status: He is alert and oriented to person, place, and time.   Psychiatric:         Mood and Affect: Mood normal.       Assessment/Plan     # Severe obstructive sleep apnea,AHI 72.1 exacerbated in supine sleep. Doing well with C Pap with good control of symptoms.   - Continue auto CPAP at 11-16 cm H2O pressure with a Respironics dream wear nasal mask gel with medium frame and medium wide nasal cushion with C-Flex 3.    - Order the dream wear nasal gel pillows cushion large and a medium frame. heated tubing, water chamber and filters from [facility]   - 10/19/22 he continues to do great with his CPAP he uses it every night for 7.4 hours AHI 0.6. He still continues to get the wrong equipment from [facility] he uses the gel pillows and he continually gets the cushions. I am going to reach out to [facility]  04/12/23 uses his CPAP every night for 7 hours AHI is 1. He is now getting the right  supplies  11/6/2024:  follow up with Dr Edwards     # Covid pneumonia   - He was hospitalized in December with COVID-pneumonia   - He was very sick requiring 15 L of oxygen at one-point he was then sent home on 2 L of oxygen   - He feels he no longer needs oxygen we will order a nocturnal pulse oximetry study on his CPAP and then we can discontinue his oxygen.   - I would like a follow-up chest CT since he had some axillary and mediastinal lymph nodes and groundglass opacities  -Chest CT does show much improvement in his groundglass opacities although we do see some scarring with some fibrosis   - repeat the CT to look at the fibrosis  04/12/23 ct looks improved from previous. he has some small nodules which are stable  11/6/2024: previous CT with some residual ground glass/possible mild fibrosis.  Previous PFT with restriction  - will get repeat PFT and CT     # Possible COPD -not found on PFT or chest CT   - There was concern during his hospitalization that he could have underlying COPD he was a smoker for about 25 years he quit 10 years ago  11/6/2024 repeat PFT for bariatric clearance     # . Hypertension, controlled sleep apnea can contribute to hypertension   - Continue follow-ups with primary care and monitor your blood pressure     # .Morbid obesity, contributing to sleep apnea   - Encouraged patient to diet and exercise to lose weight which will help in the long term treatment of sleep apnea  - patient is seeking bariatric surgery.  Needs letter for insurance coverage.      Follow up after testing    Bayron Moya MD 11/06/24 7:49 AM

## 2024-11-15 ENCOUNTER — HOSPITAL ENCOUNTER (OUTPATIENT)
Dept: RADIOLOGY | Facility: HOSPITAL | Age: 59
Discharge: HOME | End: 2024-11-15
Payer: COMMERCIAL

## 2024-11-15 ENCOUNTER — HOSPITAL ENCOUNTER (OUTPATIENT)
Dept: RESPIRATORY THERAPY | Facility: HOSPITAL | Age: 59
Discharge: HOME | End: 2024-11-15
Payer: COMMERCIAL

## 2024-11-15 DIAGNOSIS — J84.10 PULMONARY FIBROSIS (MULTI): ICD-10-CM

## 2024-11-15 DIAGNOSIS — J98.4 RESTRICTIVE LUNG DISEASE: ICD-10-CM

## 2024-11-15 DIAGNOSIS — Z98.84 BARIATRIC SURGERY STATUS: ICD-10-CM

## 2024-11-15 LAB
MGC ASCENT PFT - FEV1 - POST: 4.09
MGC ASCENT PFT - FEV1 - PRE: 3.92
MGC ASCENT PFT - FEV1 - PREDICTED: 3.78
MGC ASCENT PFT - FVC - POST: 4.96
MGC ASCENT PFT - FVC - PRE: 4.89
MGC ASCENT PFT - FVC - PREDICTED: 4.9

## 2024-11-15 PROCEDURE — 71250 CT THORAX DX C-: CPT

## 2024-11-15 PROCEDURE — 94060 EVALUATION OF WHEEZING: CPT

## 2024-11-15 PROCEDURE — 94729 DIFFUSING CAPACITY: CPT

## 2024-11-15 PROCEDURE — 94760 N-INVAS EAR/PLS OXIMETRY 1: CPT

## 2024-11-15 PROCEDURE — 94664 DEMO&/EVAL PT USE INHALER: CPT

## 2024-11-15 PROCEDURE — 94726 PLETHYSMOGRAPHY LUNG VOLUMES: CPT

## 2024-11-19 ENCOUNTER — TELEPHONE (OUTPATIENT)
Dept: ENDOCRINOLOGY | Facility: CLINIC | Age: 59
End: 2024-11-19
Payer: COMMERCIAL

## 2024-11-19 NOTE — TELEPHONE ENCOUNTER
Patient said his insurance is denying his Bariatric surgery and he is trying to appeal it by asking his doctors for a letter of support for the surgery to be done,he wants to know if you would write a letter for him

## 2024-11-20 ENCOUNTER — TELEPHONE (OUTPATIENT)
Dept: SURGERY | Facility: CLINIC | Age: 59
End: 2024-11-20
Payer: COMMERCIAL

## 2024-11-20 NOTE — TELEPHONE ENCOUNTER
called office will be sending email to  regarding appeal letter    made aware, patient provided 's email.

## 2024-11-21 DIAGNOSIS — I10 ESSENTIAL HYPERTENSION: ICD-10-CM

## 2024-11-21 PROCEDURE — RXMED WILLOW AMBULATORY MEDICATION CHARGE

## 2024-11-21 RX ORDER — LOSARTAN POTASSIUM 100 MG/1
100 TABLET ORAL DAILY
Qty: 90 TABLET | Refills: 1 | Status: CANCELLED | OUTPATIENT
Start: 2024-11-21 | End: 2025-05-20

## 2024-11-22 ENCOUNTER — PHARMACY VISIT (OUTPATIENT)
Dept: PHARMACY | Facility: CLINIC | Age: 59
End: 2024-11-22
Payer: COMMERCIAL

## 2024-11-22 DIAGNOSIS — I10 ESSENTIAL HYPERTENSION: ICD-10-CM

## 2024-11-22 RX ORDER — LOSARTAN POTASSIUM 100 MG/1
100 TABLET ORAL DAILY
Qty: 90 TABLET | Refills: 0 | Status: SHIPPED | OUTPATIENT
Start: 2024-11-22 | End: 2025-02-20

## 2024-11-22 NOTE — TELEPHONE ENCOUNTER
He said that [facility] will not fill his meds because he has an outstanding balance.  They suggested he get if filled locally so he doesn't run out while he gets this straightened out.  He has one pill left

## 2024-11-25 ENCOUNTER — PHARMACY VISIT (OUTPATIENT)
Dept: PHARMACY | Facility: CLINIC | Age: 59
End: 2024-11-25

## 2024-12-12 DIAGNOSIS — E11.65 TYPE 2 DIABETES MELLITUS WITH HYPERGLYCEMIA, WITH LONG-TERM CURRENT USE OF INSULIN: ICD-10-CM

## 2024-12-12 DIAGNOSIS — Z79.4 TYPE 2 DIABETES MELLITUS WITH HYPERGLYCEMIA, WITH LONG-TERM CURRENT USE OF INSULIN: ICD-10-CM

## 2024-12-12 DIAGNOSIS — I10 ESSENTIAL HYPERTENSION: ICD-10-CM

## 2024-12-13 PROCEDURE — RXMED WILLOW AMBULATORY MEDICATION CHARGE

## 2024-12-13 RX ORDER — BISOPROLOL FUMARATE AND HYDROCHLOROTHIAZIDE 10; 6.25 MG/1; MG/1
1 TABLET ORAL DAILY
Qty: 90 TABLET | Refills: 3 | OUTPATIENT
Start: 2024-12-13

## 2024-12-13 RX ORDER — TIRZEPATIDE 15 MG/.5ML
15 INJECTION, SOLUTION SUBCUTANEOUS
Qty: 6 ML | Refills: 1 | Status: SHIPPED | OUTPATIENT
Start: 2024-12-15

## 2024-12-13 RX ORDER — LOSARTAN POTASSIUM 100 MG/1
100 TABLET ORAL DAILY
Qty: 90 TABLET | Refills: 3 | OUTPATIENT
Start: 2024-12-13

## 2024-12-17 ENCOUNTER — PHARMACY VISIT (OUTPATIENT)
Dept: PHARMACY | Facility: CLINIC | Age: 59
End: 2024-12-17
Payer: COMMERCIAL

## 2024-12-18 DIAGNOSIS — Z79.4 TYPE 2 DIABETES MELLITUS WITH HYPERGLYCEMIA, WITH LONG-TERM CURRENT USE OF INSULIN: ICD-10-CM

## 2024-12-18 DIAGNOSIS — E11.65 TYPE 2 DIABETES MELLITUS WITH HYPERGLYCEMIA, WITH LONG-TERM CURRENT USE OF INSULIN: ICD-10-CM

## 2024-12-18 RX ORDER — DAPAGLIFLOZIN 10 MG/1
10 TABLET, FILM COATED ORAL DAILY
Qty: 90 TABLET | Refills: 1 | Status: SHIPPED | OUTPATIENT
Start: 2024-12-18 | End: 2025-12-18

## 2024-12-19 PROCEDURE — RXMED WILLOW AMBULATORY MEDICATION CHARGE

## 2024-12-21 ENCOUNTER — PHARMACY VISIT (OUTPATIENT)
Dept: PHARMACY | Facility: CLINIC | Age: 59
End: 2024-12-21
Payer: COMMERCIAL

## 2024-12-23 DIAGNOSIS — E78.2 MIXED HYPERLIPIDEMIA: Primary | ICD-10-CM

## 2024-12-23 RX ORDER — ATORVASTATIN CALCIUM 40 MG/1
40 TABLET, FILM COATED ORAL DAILY
Qty: 90 TABLET | Refills: 1 | Status: SHIPPED | OUTPATIENT
Start: 2024-12-23

## 2024-12-27 NOTE — PROGRESS NOTES
"HPI:       Hypertension:          The patient has no issues         The patients cardiovascular risk factors include:         Cardiac Risk Factors  Age > 45-male, > 55-female:  YES  +1   Smoking:   NO   Sig. family hx of CHD*:  YES  +1   Hypertension:   YES  +1   Diabetes:   YES  +1   HDL < 35:   YES  +1   HDL > 59:   NO   Total: 5     *- Sig. family h/o CHD per NCEP = MI or sudden death at <54yo in   father or other 1st-degree male relative, or <66yo in mother or   other 1st-degree female relative           The patients adherence to the treatment regimen is Good         Responses to the medications has been Good    Hyperlipidemia:   The patient does not use medications that may worsen dyslipidemias (corticosteroids, progestins, anabolic steroids, diuretics, beta-blockers, amiodarone, cyclosporine, olanzapine). The patient exercises infrequently.  The patient is not known to have coexisting coronary artery disease.    Pt has IDDM and sees endocrinology.  He uses cgm and is on insulin as well as oral meds  MEDICAL HISTORY:   Past Medical History:   Diagnosis Date    Essential hypertension     History of COVID-19 12/2021    History of DVT (deep vein thrombosis)     Hyperlipidemia     Burtch    Hypertension     Iron deficiency anemia     Long term (current) use of insulin (Multi)     Mixed hyperlipidemia     Morbid obesity with BMI of 40.0-44.9, adult (Multi)     CORTEZ (obstructive sleep apnea)     Mendoza/Strausbaugh    Pulmonary fibrosis (Multi)     Mendoza/Strausbaugh    Type 2 diabetes mellitus     Burtch    Type 2 diabetes mellitus with hyperglycemia, with long-term current use of insulin      MEDICATIONS:   Current Outpatient Medications   Medication Sig Dispense Refill    amLODIPine (Norvasc) 10 mg tablet TAKE 1 TABLET DAILY 90 tablet 0    atorvastatin (Lipitor) 40 mg tablet TAKE ONE TABLET BY MOUTH EVERY DAY 90 tablet 1    BD Floridalma 2nd Gen Pen Needle 32 gauge x 5/32\" needle       blood sugar diagnostic (OneTouch Verio " test strips) strip OneTouch Verio In Vitro Strip   Quantity: 150  Refills: 0        Start : 14-Aug-2018   Active      blood-glucose sensor (Dexcom G7 Sensor) device USE AS DIRECTED EVERY 10 DAYS 9 each 1    cholecalciferol (Vitamin D3) 1,250 mcg (50,000 unit) tablet Take 1 tablet (50,000 Units) by mouth every 7 days. 12 tablet 3    dapagliflozin propanediol (Farxiga) 10 mg Take 1 tablet (10 mg) by mouth once daily. 90 tablet 1    ferrous sulfate 325 (65 Fe) MG EC tablet Take 1 tablet by mouth once daily with breakfast. Do not crush, chew, or split. 90 tablet 3    insulin glargine (Basaglar KwikPen U-100 Insulin) 100 unit/mL (3 mL) pen  50 mL 1    insulin lispro (HumaLOG KwikPen Insulin) 100 unit/mL injection  100 mL 1    losartan (Cozaar) 100 mg tablet Take 1 tablet (100 mg) by mouth once daily. 90 tablet 0    metFORMIN  mg 24 hr tablet TAKE 4 TABLETS DAILY 360 tablet 3    pioglitazone (Actos) 15 mg tablet TAKE 1 TABLET DAILY 90 tablet 3    potassium chloride ER (Micro-K) 10 mEq ER capsule TAKE 1 CAPSULE DAILY 90 capsule 0    tirzepatide (Mounjaro) 15 mg/0.5 mL pen injector Inject 15 mg under the skin 1 (one) time per week. 6 mL 1     No current facility-administered medications for this visit.     ALLERGIES:   Allergies   Allergen Reactions    Ace Inhibitors Cough     FAMILY HISTORY:   Family History   Problem Relation Name Age of Onset    Hypothyroidism Mother EJP     Hypertension Mother EJP     Other (MORBID OBESITY) Mother EJP         GASTRIC BYPASS    Heart disease Father KDP         with stent at age 60    Hypertension Father KDP     Other (MORBID OBESITY) Father KDP         SLEEVE    Arthritis Father KDP     Diabetes Father KDP     No Known Problems Sister      No Known Problems Daughter      Other (HTN) Son      Heart disease Maternal Grandmother Eulah     Cardiomyopathy Maternal Grandmother Eulah     Diabetes Paternal Grandmother Urline     Hypertension Paternal Grandmother Urline     Transient  ischemic attack Paternal Grandmother Urline     Heart disease Paternal Grandfather Ramos     Heart attack Paternal Grandfather Ramos     Cardiomyopathy Paternal Grandfather Ramos     Stroke Paternal Grandfather Ramos     COPD Paternal Grandfather Ramos      SOCIAL HISTORY:   Social History     Tobacco Use    Smoking status: Former     Current packs/day: 0.00     Average packs/day: 1 pack/day for 23.1 years (23.1 ttl pk-yrs)     Types: Cigarettes, Cigars     Start date: 10/1/1985     Quit date: 11/15/2008     Years since quittin.2     Passive exposure: Current    Smokeless tobacco: Former     Types: Snuff     Quit date: 1998    Tobacco comments:     Still smoke cigars   Vaping Use    Vaping status: Never Used   Substance Use Topics    Alcohol use: Not Currently    Drug use: Never         ROS:      CONSTITUTION:  alert and oriented     OPHTHALMOLOGY:          no Change in vision.         CARDIOLOGY:          no Chest pain.  no Dyspnea on exertion.  no Shortness of breath.         ENDOCRINOLOGY:          no Excessive thirst.  no Excessive urination.  no Fatigue.  no Skin changes.  no Weight gain.  no Weight loss.         SKIN:          no Healing problems.         NEUROLOGY:          no Loss of sensation in specific body area.  no Burning pain in feet.  no Peripheral neuropathy.  no Tingling/numbness.    LABS:   Lab Results   Component Value Date    GLUCOSE 152 (H) 2024    CALCIUM 9.4 2024     2024    K 3.9 2024    CO2 25 2024     2024    BUN 24 (H) 2024    CREATININE 1.59 (H) 2024     Lab Results   Component Value Date    CHOL 169 2024    CHOL 172 2024    CHOL 108 (L) 2023     Lab Results   Component Value Date    HDL 28.4 2024    HDL 35.4 2024    HDL 29 (L) 2023     Lab Results   Component Value Date    LDLCALC 82 2024    LDLCALC 111 (H) 2024    LDLCALC 57 (L) 2023     Lab Results   Component Value Date     TRIG 292 (H) 07/30/2024    TRIG 129 02/26/2024    TRIG 108 03/22/2023      Latest Reference Range & Units 08/19/24 09:14   Albumin, Urine Random Not established mg/L 25.5   Creatinine, Urine Random 20.0 - 370.0 mg/dL 96.1   Albumin/Creatinine Ratio <30.0 ug/mg Creat 26.5       Lab Results   Component Value Date    HGBA1C 7.1 (A) 10/03/2024          VITAL SIGNS:  /76   Pulse 78   Wt 119 kg (262 lb 3.2 oz)   SpO2 97%   BMI 34.59 kg/m²     General Appearance: awake, alert, oriented, in no acute distress  Lungs: Normal expansion.  Clear to auscultation.  No rales, rhonchi, or wheezing.  Heart: Heart sounds are normal.  Regular rate and rhythm without murmur, gallop or rub.  Extremities: Extremities warm to touch, pink, with no edema.  Neurologic: Alert and oriented x 3, gait normal., reflexes normal and symmetric, strength and  sensation grossly normal    Mickey was seen today for hypertension.  Diagnoses and all orders for this visit:  Essential hypertension (Primary)  Comments:  cont amlodipine, losartin, and potassium  Orders:  -     Follow Up In Primary Care - Established  -     Follow Up In Primary Care - Established; Future  Mixed hyperlipidemia  Comments:  cont atorvastastin

## 2025-01-10 ENCOUNTER — APPOINTMENT (OUTPATIENT)
Dept: PULMONOLOGY | Facility: CLINIC | Age: 60
End: 2025-01-10
Payer: COMMERCIAL

## 2025-01-10 VITALS
SYSTOLIC BLOOD PRESSURE: 135 MMHG | HEART RATE: 75 BPM | DIASTOLIC BLOOD PRESSURE: 88 MMHG | WEIGHT: 268.2 LBS | OXYGEN SATURATION: 96 % | BODY MASS INDEX: 35.38 KG/M2

## 2025-01-10 DIAGNOSIS — J84.10 PULMONARY FIBROSIS (MULTI): ICD-10-CM

## 2025-01-10 DIAGNOSIS — G47.33 SEVERE OBSTRUCTIVE SLEEP APNEA: Primary | ICD-10-CM

## 2025-01-10 PROCEDURE — 3079F DIAST BP 80-89 MM HG: CPT | Performed by: PEDIATRICS

## 2025-01-10 PROCEDURE — 3075F SYST BP GE 130 - 139MM HG: CPT | Performed by: PEDIATRICS

## 2025-01-10 PROCEDURE — 99215 OFFICE O/P EST HI 40 MIN: CPT | Performed by: PEDIATRICS

## 2025-01-10 PROCEDURE — 1036F TOBACCO NON-USER: CPT | Performed by: PEDIATRICS

## 2025-01-10 PROCEDURE — 4010F ACE/ARB THERAPY RXD/TAKEN: CPT | Performed by: PEDIATRICS

## 2025-01-10 NOTE — PROGRESS NOTES
Subjective   Patient ID: Mickey Vera is a 59 y.o. male who presents for Follow-up (Fuv pft and 6 min results ). Post covid pulmonary fivrosis    HPI      3/22/19: 53-year-old  male with sleep apnea here with his CPAP. He is doing well with his CPAP. He is using the CPAP every night for 7 hours and his AHI is 1.4. During his study positive pressure was initiated at 6 and increased for respiratory events an optimal pressure could not be determined. He will need an auto uat 11-15 cm H2O pressure with a dream wear nasal mask with medium frame and medium wide nasal cushion with C-Flex 3. Sleep study from 10/10/18 shows an AHI of 72.1. The REM AHI was 66.3. The supine AHI was 95.2. The O2 desaturation index was 91. The SPO2 alis was 79%. Has some symptoms of restless leg syndrome but also has a history of leg pain as well as shoulder pain. Which makes him move around at night. Both his parents have sleep apnea. Comorbidities include diabetes, hypertension and hyperlipidemia. He denies shortness of breath, coughing or wheezing. No nasal complaints. He was a smoker smoking 1 pack per day but quit over 8 years ago. He is an [employer]  and now works for [employer]. 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 [place] is 6/24      01/09/2020: Since his last visit he is doing a fantastic job with his CPAP machine. He uses it every night for 7-1/2 hours. 95th percentile pressure is 13.5. AHI 0.9. He does need new equipment for his machine.     01/14/2021: He is here today with his CPAP for follow-up. He uses his CPAP every night for a little more than 7 hours his AHI is 1.0. His 90th percentile pressure is 13. He feels rested with sleep. He does a fantastic job with his CPAP machine. He does need new supplies today.     01/13/2022 he is being seen today for follow-up. Unfortunately patient had COVID-pneumonia and was hospitalized in December. He was sent home on 2 L of oxygen  but has since weaned himself off oxygen checking his oxygen regularly at home he remains in the middle 90s. He tells me he feels much better now he actually went back to work this week. He is taking it easy. He would like to get rid of his home oxygen. I told him we can do a nocturnal pulse oximetry study on his CPAP to make sure he no longer needs it and then we can discontinue his oxygen. While he was hospitalized there was concern that he might have COPD. He was a smoker he smoked a pack a day for about 25 years. I will order a chest CT for follow-up from the COVID-pneumonia and a PFT but I do not want these done until the end of March. He he had a DVT and currently is on Xarelto. He has already followed up with his PCP.     04/13/2022: He is here today for follow-up. He did have a pulmonary function test shows some restriction which does correct for low alveolar volumes. FEV1 is 78%. He did quit smoking 13 years ago. We did a chest CT which does show much improvement in the groundglass opacities after having COVID. He does have some what looks to be fibrosis in the lower lobes which we will keep an eye on. I suggest he have a new chest CT in 1 year     04/12/23 he is here today for follow-up. He is doing great with his CPAP he uses it every night for 7 hours AHI is 1. CPAP is set from 11-16. 90th percentile pressure is 13. We will look next year and see if he is at the 5-year [facility]oria yet where we can order him a new CPAP. He is now finally getting the right supplies. I did a chest CT on him for some post-COVID fibrosis. It does show improvement from previous. He does have some small pulmonary nodules. We do another chest CT in 1 year as patient has a history of smoking. He did quit 9 or 10 years ago but still smokes cigars on occasion. 10 minutes spent preparing patient's chart. 20 minutes spent with patient answering questions and determining care. 10 minutes spent charting and ordering tests and medications      11/6/2024:  Mr Vera is here for follow up of post covid pulmonary fibrosis as well as bariatric surgery pulmonary clearance.  He is feeling well from a pulmonary standpoint.  He no longer gets short of breath.  He has lost about 50lbs since his last visit.    1/10/2025:  Mr Vera is doing well.  H did a PFT which is normal.  He had a HRCT which shows unchanged subpleural reticulation which is mild      Review of Systems    See scanned documents attached to this note for review of systems, and appropriate scales/scores for this visit.     Objective   Physical Exam  Constitutional:       Appearance: Normal appearance.   HENT:      Head: Normocephalic and atraumatic.      Mouth/Throat:      Pharynx: Oropharynx is clear.   Cardiovascular:      Rate and Rhythm: Normal rate and regular rhythm.      Pulses: Normal pulses.      Heart sounds: Normal heart sounds.   Pulmonary:      Effort: Pulmonary effort is normal.      Breath sounds: Normal breath sounds. No wheezing, rhonchi or rales.   Abdominal:      General: Bowel sounds are normal.      Palpations: Abdomen is soft.   Musculoskeletal:         General: Normal range of motion.   Skin:     General: Skin is warm and dry.   Neurological:      General: No focal deficit present.      Mental Status: He is alert and oriented to person, place, and time.   Psychiatric:         Mood and Affect: Mood normal.       Assessment/Plan     # Severe obstructive sleep apnea,AHI 72.1 exacerbated in supine sleep. Doing well with C Pap with good control of symptoms.   - Continue auto CPAP at 11-16 cm H2O pressure with a Respironics dream wear nasal mask gel with medium frame and medium wide nasal cushion with C-Flex 3.    - Order the dream wear nasal gel pillows cushion large and a medium frame. heated tubing, water chamber and filters from [facility]   - 10/19/22 he continues to do great with his CPAP he uses it every night for 7.4 hours AHI 0.6. He still continues to get the wrong equipment  from [facility] he uses the gel pillows and he continually gets the cushions. I am going to reach out to [facility]  04/12/23 uses his CPAP every night for 7 hours AHI is 1. He is now getting the right supplies  11/6/2024:  follow up with Dr Edwards  1/10/2025: sees Dr Edwards     # Covid pneumonia   - He was hospitalized in December with COVID-pneumonia   - He was very sick requiring 15 L of oxygen at one-point he was then sent home on 2 L of oxygen   - He feels he no longer needs oxygen we will order a nocturnal pulse oximetry study on his CPAP and then we can discontinue his oxygen.   - I would like a follow-up chest CT since he had some axillary and mediastinal lymph nodes and groundglass opacities  -Chest CT does show much improvement in his groundglass opacities although we do see some scarring with some fibrosis   - repeat the CT to look at the fibrosis  04/12/23 ct looks improved from previous. he has some small nodules which are stable  11/6/2024: previous CT with some residual ground glass/possible mild fibrosis.  Previous PFT with restriction  - will get repeat PFT and CT  1/10/2025:  CT with no progression which is expected for a post infectious process.  PFT is normal     # Possible COPD -not found on PFT or chest CT   - There was concern during his hospitalization that he could have underlying COPD he was a smoker for about 25 years he quit 10 years ago  11/6/2024 repeat PFT for bariatric clearance  1/10/2025:  The patient is cleared for bariatric surgery from a pulmonary standpoint     # . Hypertension, controlled sleep apnea can contribute to hypertension   - Continue follow-ups with primary care and monitor your blood pressure     # .Morbid obesity, contributing to sleep apnea   - Encouraged patient to diet and exercise to lose weight which will help in the long term treatment of sleep apnea  - patient is seeking bariatric surgery.  Needs letter for insurance coverage.     Follow up prn       Bayron Moya MD  01/10/25 7:58 AM

## 2025-01-13 DIAGNOSIS — I10 ESSENTIAL HYPERTENSION: ICD-10-CM

## 2025-01-13 PROCEDURE — RXMED WILLOW AMBULATORY MEDICATION CHARGE

## 2025-01-13 RX ORDER — AMLODIPINE BESYLATE 10 MG/1
10 TABLET ORAL DAILY
Qty: 90 TABLET | Refills: 0 | Status: SHIPPED | OUTPATIENT
Start: 2025-01-13

## 2025-01-13 RX ORDER — POTASSIUM CHLORIDE 750 MG/1
10 CAPSULE, EXTENDED RELEASE ORAL DAILY
Qty: 90 CAPSULE | Refills: 0 | Status: SHIPPED | OUTPATIENT
Start: 2025-01-13

## 2025-01-16 ENCOUNTER — PHARMACY VISIT (OUTPATIENT)
Dept: PHARMACY | Facility: CLINIC | Age: 60
End: 2025-01-16
Payer: COMMERCIAL

## 2025-01-28 PROCEDURE — RXMED WILLOW AMBULATORY MEDICATION CHARGE

## 2025-01-30 ENCOUNTER — PHARMACY VISIT (OUTPATIENT)
Dept: PHARMACY | Facility: CLINIC | Age: 60
End: 2025-01-30
Payer: COMMERCIAL

## 2025-02-10 ENCOUNTER — OFFICE VISIT (OUTPATIENT)
Dept: PRIMARY CARE | Facility: CLINIC | Age: 60
End: 2025-02-10
Payer: COMMERCIAL

## 2025-02-10 VITALS
BODY MASS INDEX: 34.59 KG/M2 | SYSTOLIC BLOOD PRESSURE: 134 MMHG | DIASTOLIC BLOOD PRESSURE: 76 MMHG | HEART RATE: 78 BPM | WEIGHT: 262.2 LBS | OXYGEN SATURATION: 97 %

## 2025-02-10 DIAGNOSIS — E78.2 MIXED HYPERLIPIDEMIA: ICD-10-CM

## 2025-02-10 DIAGNOSIS — I10 ESSENTIAL HYPERTENSION: Primary | ICD-10-CM

## 2025-02-10 PROCEDURE — 4010F ACE/ARB THERAPY RXD/TAKEN: CPT | Performed by: FAMILY MEDICINE

## 2025-02-10 PROCEDURE — 3078F DIAST BP <80 MM HG: CPT | Performed by: FAMILY MEDICINE

## 2025-02-10 PROCEDURE — 1036F TOBACCO NON-USER: CPT | Performed by: FAMILY MEDICINE

## 2025-02-10 PROCEDURE — 99214 OFFICE O/P EST MOD 30 MIN: CPT | Performed by: FAMILY MEDICINE

## 2025-02-10 PROCEDURE — 3075F SYST BP GE 130 - 139MM HG: CPT | Performed by: FAMILY MEDICINE

## 2025-02-10 ASSESSMENT — PAIN SCALES - GENERAL: PAINLEVEL_OUTOF10: 0-NO PAIN

## 2025-02-10 ASSESSMENT — PATIENT HEALTH QUESTIONNAIRE - PHQ9
2. FEELING DOWN, DEPRESSED OR HOPELESS: NOT AT ALL
1. LITTLE INTEREST OR PLEASURE IN DOING THINGS: NOT AT ALL
SUM OF ALL RESPONSES TO PHQ9 QUESTIONS 1 AND 2: 0

## 2025-02-21 PROCEDURE — RXMED WILLOW AMBULATORY MEDICATION CHARGE

## 2025-02-25 ENCOUNTER — PHARMACY VISIT (OUTPATIENT)
Dept: PHARMACY | Facility: CLINIC | Age: 60
End: 2025-02-25
Payer: COMMERCIAL

## 2025-02-27 DIAGNOSIS — I10 ESSENTIAL HYPERTENSION: ICD-10-CM

## 2025-02-27 RX ORDER — LOSARTAN POTASSIUM 100 MG/1
100 TABLET ORAL DAILY
Qty: 90 TABLET | Refills: 1 | Status: SHIPPED | OUTPATIENT
Start: 2025-02-27

## 2025-03-04 PROCEDURE — RXMED WILLOW AMBULATORY MEDICATION CHARGE

## 2025-03-05 ENCOUNTER — PHARMACY VISIT (OUTPATIENT)
Dept: PHARMACY | Facility: CLINIC | Age: 60
End: 2025-03-05
Payer: COMMERCIAL

## 2025-03-20 PROCEDURE — RXMED WILLOW AMBULATORY MEDICATION CHARGE

## 2025-03-21 ENCOUNTER — PHARMACY VISIT (OUTPATIENT)
Dept: PHARMACY | Facility: CLINIC | Age: 60
End: 2025-03-21
Payer: COMMERCIAL

## 2025-04-02 NOTE — PROGRESS NOTES
"HPI   58 yo with Diabetes 2 (dx mid 40's), HTN, Dyslipidemia , CORTEZ on cpap, highest wt 360lbs presents for followup. Last A1c-7.1%, today 6.8%.       Pt is testing sugars >4 times per day using dexcom G7. Pt is having low sugars <1 times/week. Pt is following a carb controlled diet and knows reasonable carb allowances. Reports eating between 1200 and 1500 calories a day.     Taking basaglar 15 (was 20) units, humalog 5-10 units with meals (tends to skip if sugars are less than 150), mounjaro 15mg weekly, farxiga 10mg daily, metformin er 500mg (4), and srheya 15mg.         Pt still interested in wt loss surgery, working with bariatric surgery.  -pt would like to get closer to 240lbs     14 day dexcom G7 data: 72% in range, <1% low, pattern: low 100's overnight into waking, up to 180 after lunch and up to 200's after some dinners, otherwise mid 100's.     Pt had workup for low tesosterone and it was normal     Taking atorvastatin 40mg daily had aches, pcp changed to weekly  Taking losartan 100mg, amlodipine 10mg, bisoprolol-hydrochlorothiazide 10-6.25mg (stopped since last visit) for htn.      -labs reviewed - kidney function has been fluctuating, pt is avoiding NSAIDS and taking Tylenol prn for pain.   -recently start iron and vitamin D per Dr. Tran.    Current Outpatient Medications:     amLODIPine (Norvasc) 10 mg tablet, TAKE 1 TABLET DAILY, Disp: 90 tablet, Rfl: 0    atorvastatin (Lipitor) 40 mg tablet, TAKE ONE TABLET BY MOUTH EVERY DAY, Disp: 90 tablet, Rfl: 1    BD Floridalma 2nd Gen Pen Needle 32 gauge x 5/32\" needle, , Disp: , Rfl:     blood sugar diagnostic (OneTouch Verio test strips) strip, OneTouch Verio In Vitro Strip  Quantity: 150  Refills: 0      Start : 14-Aug-2018  Active, Disp: , Rfl:     blood-glucose sensor (Dexcom G7 Sensor) device, USE AS DIRECTED EVERY 10 DAYS, Disp: 9 each, Rfl: 1    cholecalciferol (Vitamin D3) 1,250 mcg (50,000 unit) tablet, Take 1 tablet (50,000 Units) by mouth every 7 days., " "Disp: 12 tablet, Rfl: 3    dapagliflozin propanediol (Farxiga) 10 mg, Take 1 tablet (10 mg) by mouth once daily., Disp: 90 tablet, Rfl: 1    ferrous sulfate 325 (65 Fe) MG EC tablet, Take 1 tablet by mouth once daily with breakfast. Do not crush, chew, or split., Disp: 90 tablet, Rfl: 3    insulin glargine (Basaglar KwikPen U-100 Insulin) 100 unit/mL (3 mL) pen, , Disp: 50 mL, Rfl: 1    insulin lispro (HumaLOG KwikPen Insulin) 100 unit/mL injection, , Disp: 100 mL, Rfl: 1    losartan (Cozaar) 100 mg tablet, TAKE ONE TABLET BY MOUTH DAILY, Disp: 90 tablet, Rfl: 1    metFORMIN  mg 24 hr tablet, TAKE 4 TABLETS DAILY, Disp: 360 tablet, Rfl: 3    pioglitazone (Actos) 15 mg tablet, TAKE 1 TABLET DAILY, Disp: 90 tablet, Rfl: 3    tirzepatide (Mounjaro) 15 mg/0.5 mL pen injector, Inject 15 mg under the skin every 7 days., Disp: 2 mL, Rfl: 3      Allergies as of 04/03/2025 - Reviewed 04/03/2025   Allergen Reaction Noted    Ace inhibitors Cough 09/15/2023         Review of Systems   Cardiology: Lightheadedness-denies.  Chest pain-denies.  Leg edema-denies.  Palpitations-denies.  Respiratory: Cough-denies. Shortness of breath-denies.  Wheezing-denies.  Gastroenterology: Constipation-denies.  Diarrhea-denies.  Heartburn-denies.  Endocrinology: Cold intolerance-denies.  Heat intolerance-denies.  Sweats-denies.  Neurology: Headache-denies.  Tremor-denies.  Neuropathy in extremities-denies.  Psychology: Low energy-denies.  Irritability-denies.  Sleep disturbances-denies.      /83 (BP Location: Left arm, Patient Position: Sitting)   Pulse 69   Ht 1.854 m (6' 1\")   Wt 119 kg (261 lb 12.8 oz)   BMI 34.54 kg/m²       Labs:  Lab Results   Component Value Date    WBC 7.4 07/31/2024    NRBC 0.0 07/31/2024    RBC 5.23 07/31/2024    HGB 15.2 07/31/2024    HCT 46.6 07/31/2024     07/31/2024     Lab Results   Component Value Date    CALCIUM 9.4 07/30/2024    AST 13 07/31/2024    ALKPHOS 90 07/31/2024    BILITOT 0.5 " 07/31/2024    PROT 7.6 07/31/2024    ALBUMIN 4.6 07/31/2024    GLOB 2.6 03/22/2023    AGR 1.5 03/22/2023     07/30/2024    K 3.9 07/30/2024     07/30/2024    CO2 25 07/30/2024    ANIONGAP 13 07/30/2024    BUN 24 (H) 07/30/2024    CREATININE 1.59 (H) 07/30/2024    UREACREAUR 13.1 03/22/2023    GLUCOSE 152 (H) 07/30/2024    ALT 16 07/31/2024    EGFR 50 (L) 07/30/2024     Lab Results   Component Value Date    CHOL 169 07/30/2024    TRIG 292 (H) 07/30/2024    HDL 28.4 07/30/2024    LDLCALC 82 07/30/2024     Lab Results   Component Value Date    MICROALBCREA 26.5 08/19/2024     Lab Results   Component Value Date    TSH 2.16 07/31/2024     Lab Results   Component Value Date    [id number] 635 07/31/2024     Lab Results   Component Value Date    HGBA1C 6.8 (A) 04/03/2025         Physical Exam   General Appearance: pleasant, cooperative, no acute distress  HEENT: no chemosis, no proptosis, no lid lag, no lid retraction  Neck: no lymphadenopathy, no thyromegaly, no dominant thyroid nodules  Heart: no murmurs, regular rate and rhythm, S1 and S2  Lungs: no wheezes, no rhonci, no rales  Extremities: no lower extremity swelling      Assessment/Plan   1. Type 2 diabetes mellitus with hyperglycemia, with long-term current use of insulin (Primary)  -A1c ordered and reviewed  -glycemic log reviewed, dexcom data scanned in epic  -labs reviewed, new labs ordered    -overall doing great, trying to get to 240 lbs, wt loss surgery has not been approved, at this point continue current therapy  -5-10 units prandial insulin with dinner, can lower as wt loss continues    2. Essential hypertension  -at target, improved after eliminating bp med last visit, suspect renal function should normalize as well, repeat labs  -limit nsaids    3. Mixed hyperlipidemia  -at target on statin, repeat labs, will follow         Follow Up:  pharmD 6 months    Medical Decision Making  Complexity of problem: Chronic illness of diabetes mellitus  uncontrolled, progressing  Data analyzed and reviewed: Reviewed prior notes, blood glucose data, labs including HgbA1c, lipids, serum chemistries.  Ordered tests.   Risk of complications and morbidities: Is definite because of use of insulin and risk of hypoglycemia.  Prescription medications reviewed and modifications made.  Compliance assessed.  Addressed social determinants of health including food insecurity.

## 2025-04-03 ENCOUNTER — APPOINTMENT (OUTPATIENT)
Dept: ENDOCRINOLOGY | Facility: CLINIC | Age: 60
End: 2025-04-03
Payer: COMMERCIAL

## 2025-04-03 VITALS
SYSTOLIC BLOOD PRESSURE: 130 MMHG | HEIGHT: 73 IN | WEIGHT: 261.8 LBS | DIASTOLIC BLOOD PRESSURE: 83 MMHG | HEART RATE: 69 BPM | BODY MASS INDEX: 34.7 KG/M2

## 2025-04-03 DIAGNOSIS — E11.65 TYPE 2 DIABETES MELLITUS WITH HYPERGLYCEMIA, WITH LONG-TERM CURRENT USE OF INSULIN: Primary | ICD-10-CM

## 2025-04-03 DIAGNOSIS — E53.8 B12 DEFICIENCY: ICD-10-CM

## 2025-04-03 DIAGNOSIS — I10 ESSENTIAL HYPERTENSION: ICD-10-CM

## 2025-04-03 DIAGNOSIS — Z79.4 TYPE 2 DIABETES MELLITUS WITH HYPERGLYCEMIA, WITH LONG-TERM CURRENT USE OF INSULIN: Primary | ICD-10-CM

## 2025-04-03 DIAGNOSIS — E78.2 MIXED HYPERLIPIDEMIA: ICD-10-CM

## 2025-04-03 LAB — POC HEMOGLOBIN A1C: 6.8 % (ref 4.2–6.5)

## 2025-04-03 PROCEDURE — 3008F BODY MASS INDEX DOCD: CPT | Performed by: INTERNAL MEDICINE

## 2025-04-03 PROCEDURE — 4010F ACE/ARB THERAPY RXD/TAKEN: CPT | Performed by: INTERNAL MEDICINE

## 2025-04-03 PROCEDURE — 83036 HEMOGLOBIN GLYCOSYLATED A1C: CPT | Performed by: INTERNAL MEDICINE

## 2025-04-03 PROCEDURE — 3075F SYST BP GE 130 - 139MM HG: CPT | Performed by: INTERNAL MEDICINE

## 2025-04-03 PROCEDURE — 1036F TOBACCO NON-USER: CPT | Performed by: INTERNAL MEDICINE

## 2025-04-03 PROCEDURE — RXMED WILLOW AMBULATORY MEDICATION CHARGE

## 2025-04-03 PROCEDURE — 95251 CONT GLUC MNTR ANALYSIS I&R: CPT | Performed by: INTERNAL MEDICINE

## 2025-04-03 PROCEDURE — 99214 OFFICE O/P EST MOD 30 MIN: CPT | Performed by: INTERNAL MEDICINE

## 2025-04-03 PROCEDURE — 3079F DIAST BP 80-89 MM HG: CPT | Performed by: INTERNAL MEDICINE

## 2025-04-04 ENCOUNTER — PHARMACY VISIT (OUTPATIENT)
Dept: PHARMACY | Facility: CLINIC | Age: 60
End: 2025-04-04
Payer: COMMERCIAL

## 2025-04-11 DIAGNOSIS — Z79.4 TYPE 2 DIABETES MELLITUS WITH HYPERGLYCEMIA, WITH LONG-TERM CURRENT USE OF INSULIN: ICD-10-CM

## 2025-04-11 DIAGNOSIS — E11.65 TYPE 2 DIABETES MELLITUS WITH HYPERGLYCEMIA, WITH LONG-TERM CURRENT USE OF INSULIN: ICD-10-CM

## 2025-04-11 DIAGNOSIS — I10 ESSENTIAL HYPERTENSION: ICD-10-CM

## 2025-04-11 RX ORDER — AMLODIPINE BESYLATE 10 MG/1
10 TABLET ORAL DAILY
Qty: 90 TABLET | Refills: 0 | Status: SHIPPED | OUTPATIENT
Start: 2025-04-11

## 2025-04-11 RX ORDER — METFORMIN HYDROCHLORIDE 500 MG/1
TABLET, EXTENDED RELEASE ORAL
Qty: 360 TABLET | Refills: 3 | Status: SHIPPED | OUTPATIENT
Start: 2025-04-11

## 2025-04-28 PROCEDURE — RXMED WILLOW AMBULATORY MEDICATION CHARGE

## 2025-04-29 ENCOUNTER — PHARMACY VISIT (OUTPATIENT)
Dept: PHARMACY | Facility: CLINIC | Age: 60
End: 2025-04-29
Payer: COMMERCIAL

## 2025-04-30 LAB
ALBUMIN SERPL-MCNC: 4.2 G/DL (ref 3.6–5.1)
ALBUMIN/CREAT UR: 41 MG/G CREAT
ALP SERPL-CCNC: 78 U/L (ref 35–144)
ALT SERPL-CCNC: 14 U/L (ref 9–46)
ANION GAP SERPL CALCULATED.4IONS-SCNC: 10 MMOL/L (CALC) (ref 7–17)
AST SERPL-CCNC: 15 U/L (ref 10–35)
BASOPHILS # BLD AUTO: 58 CELLS/UL (ref 0–200)
BASOPHILS NFR BLD AUTO: 1.1 %
BILIRUB SERPL-MCNC: 0.7 MG/DL (ref 0.2–1.2)
BUN SERPL-MCNC: 25 MG/DL (ref 7–25)
CALCIUM SERPL-MCNC: 9.4 MG/DL (ref 8.6–10.3)
CHLORIDE SERPL-SCNC: 106 MMOL/L (ref 98–110)
CHOLEST SERPL-MCNC: 170 MG/DL
CHOLEST/HDLC SERPL: 4.3 (CALC)
CO2 SERPL-SCNC: 25 MMOL/L (ref 20–32)
CREAT SERPL-MCNC: 1.3 MG/DL (ref 0.7–1.3)
CREAT UR-MCNC: 99 MG/DL (ref 20–320)
EGFRCR SERPLBLD CKD-EPI 2021: 63 ML/MIN/1.73M2
EOSINOPHIL # BLD AUTO: 249 CELLS/UL (ref 15–500)
EOSINOPHIL NFR BLD AUTO: 4.7 %
ERYTHROCYTE [DISTWIDTH] IN BLOOD BY AUTOMATED COUNT: 13.6 % (ref 11–15)
GLUCOSE SERPL-MCNC: 151 MG/DL (ref 65–99)
HCT VFR BLD AUTO: 46.4 % (ref 38.5–50)
HDLC SERPL-MCNC: 40 MG/DL
HGB BLD-MCNC: 15 G/DL (ref 13.2–17.1)
LDLC SERPL CALC-MCNC: 104 MG/DL (CALC)
LYMPHOCYTES # BLD AUTO: 1468 CELLS/UL (ref 850–3900)
LYMPHOCYTES NFR BLD AUTO: 27.7 %
MCH RBC QN AUTO: 28.6 PG (ref 27–33)
MCHC RBC AUTO-ENTMCNC: 32.3 G/DL (ref 32–36)
MCV RBC AUTO: 88.5 FL (ref 80–100)
MICROALBUMIN UR-MCNC: 4.1 MG/DL
MONOCYTES # BLD AUTO: 631 CELLS/UL (ref 200–950)
MONOCYTES NFR BLD AUTO: 11.9 %
NEUTROPHILS # BLD AUTO: 2894 CELLS/UL (ref 1500–7800)
NEUTROPHILS NFR BLD AUTO: 54.6 %
NONHDLC SERPL-MCNC: 130 MG/DL (CALC)
PLATELET # BLD AUTO: 248 THOUSAND/UL (ref 140–400)
PMV BLD REES-ECKER: 9.5 FL (ref 7.5–12.5)
POTASSIUM SERPL-SCNC: 4.2 MMOL/L (ref 3.5–5.3)
PROT SERPL-MCNC: 6.5 G/DL (ref 6.1–8.1)
RBC # BLD AUTO: 5.24 MILLION/UL (ref 4.2–5.8)
SODIUM SERPL-SCNC: 141 MMOL/L (ref 135–146)
TRIGL SERPL-MCNC: 143 MG/DL
VIT B12 SERPL-MCNC: 391 PG/ML (ref 200–1100)
WBC # BLD AUTO: 5.3 THOUSAND/UL (ref 3.8–10.8)

## 2025-05-01 ENCOUNTER — PHARMACY VISIT (OUTPATIENT)
Dept: PHARMACY | Facility: CLINIC | Age: 60
End: 2025-05-01
Payer: COMMERCIAL

## 2025-05-02 ENCOUNTER — APPOINTMENT (OUTPATIENT)
Dept: SLEEP MEDICINE | Facility: CLINIC | Age: 60
End: 2025-05-02
Payer: COMMERCIAL

## 2025-05-02 VITALS
OXYGEN SATURATION: 97 % | SYSTOLIC BLOOD PRESSURE: 124 MMHG | DIASTOLIC BLOOD PRESSURE: 84 MMHG | WEIGHT: 264.2 LBS | HEART RATE: 66 BPM | BODY MASS INDEX: 34.86 KG/M2

## 2025-05-02 DIAGNOSIS — E66.811 CLASS 1 OBESITY DUE TO EXCESS CALORIES WITH BODY MASS INDEX (BMI) OF 34.0 TO 34.9 IN ADULT, UNSPECIFIED WHETHER SERIOUS COMORBIDITY PRESENT: ICD-10-CM

## 2025-05-02 DIAGNOSIS — E66.09 CLASS 1 OBESITY DUE TO EXCESS CALORIES WITH BODY MASS INDEX (BMI) OF 34.0 TO 34.9 IN ADULT, UNSPECIFIED WHETHER SERIOUS COMORBIDITY PRESENT: ICD-10-CM

## 2025-05-02 DIAGNOSIS — F45.8 AEROPHAGIA: ICD-10-CM

## 2025-05-02 DIAGNOSIS — G47.33 OSA (OBSTRUCTIVE SLEEP APNEA): Primary | ICD-10-CM

## 2025-05-02 PROCEDURE — 3044F HG A1C LEVEL LT 7.0%: CPT | Performed by: PSYCHIATRY & NEUROLOGY

## 2025-05-02 PROCEDURE — 3079F DIAST BP 80-89 MM HG: CPT | Performed by: PSYCHIATRY & NEUROLOGY

## 2025-05-02 PROCEDURE — 4010F ACE/ARB THERAPY RXD/TAKEN: CPT | Performed by: PSYCHIATRY & NEUROLOGY

## 2025-05-02 PROCEDURE — 3074F SYST BP LT 130 MM HG: CPT | Performed by: PSYCHIATRY & NEUROLOGY

## 2025-05-02 PROCEDURE — 99214 OFFICE O/P EST MOD 30 MIN: CPT | Performed by: PSYCHIATRY & NEUROLOGY

## 2025-05-02 ASSESSMENT — PATIENT HEALTH QUESTIONNAIRE - PHQ9
2. FEELING DOWN, DEPRESSED OR HOPELESS: NOT AT ALL
SUM OF ALL RESPONSES TO PHQ9 QUESTIONS 1 AND 2: 0
1. LITTLE INTEREST OR PLEASURE IN DOING THINGS: NOT AT ALL

## 2025-05-02 NOTE — PROGRESS NOTES
Patient: Mickey Vera    [id number]  : 1965 -- AGE 59 y.o.    Provider: Pepe Edwards MD     Levine, Susan. \Hospital Has a New Name and Outlook.\""   Service Date: 2025              [facility]  Follow-up Note    HPI: Mickey Vera is a 59 y.o. male with severe CORTEZ on CPAP. He is here today for a follow up visit. Last seen by me on 24.    Weight loss: At last visit he was 281 lbs and is he is now down close to another 20 pounds. He has lost a total of 100 lbs in about the last 1.5 years with diet and Mounjaro. No longer pursuing bariatric surgery.    Using CPAP nightly for about 7 hours/night.    He got a new CPAP machine in late . He has not taken it out of the box and has not used it. He is sleeping with his original CPAP machine, which he got shortly after his sleep study in . Sleeps very well with CPAP.    Wakes up feeling refreshed. Sometimes takes a short nap after work. No drowsy driving or difficulty functioning in the daytime.    A couple times he has woken up in the night to urinate and feels air in his stomach, relieved with a belch.      NIGHTTIME SYMPTOMS:   Ongoing Snoring: no    PAP DEVICE   DME: [facility]  Type: CPAP  Finding benefit: yes  MASK  Type: nasal pillows  Fit: good  Patient is keeping the equipment clean and supplies are being renewed at appropriate intervals.     Prior Sleep studies:   -PSG 10/10/2018: wt 145 kg, BMI 42.37. Severe CORTEZ - AHI 72, REM AHI 66, supine AHI 95 - there were 116 obstructive apneas, 2 mixed apneas, 3 central apneas, and 314 hypopneas. SpO2 alis 79% with 51.9 min <90%.   -CPAP titration 2018: weight 320 lbs. Titrated 6-11 cm H2O. Overall AHI 9.5. AHI 6.1 during the 263 min spent at 11 cm H2O, SpO2 alis 89%, but there were persistent hypopneas during supine REM at this setting.                REVIEW OF MACHINE DOWNLOAD:   Date range: 25-25  Days used: 100%  Days used >4 hours: 100%  Average usage (days used): 7 h 35  min  Settin-16 cm H2O, EPR 3  Pressure: 95th %ile 12.2 cm H2O, median 11.2 cm H2O, max 13.1 cm H2O  Leak: 21 L/min (95th %ile), median 0 L/min  AHI: 0.8    Problem List[1]  Medical History[2]  Surgical History[3]  Medications Ordered Prior to Encounter[4]    PHYSICAL EXAMINATION:   Vitals:    25 0758   BP: 124/84   BP Location: Left arm   Patient Position: Sitting   BP Cuff Size: Adult   Pulse: 66   SpO2: 97%   Weight: 120 kg (264 lb 3.2 oz)     Body mass index is 34.86 kg/m².  General: Awake. Alert. Comfortable. No apparent distress. Thick, long beard  Speech: Normal.  Comprehension: Normal.  Mood: Stable.  Affect: Appropriate.  Pul:         Normal respiratory effort.   Abd:         Obese  Neuro: Alert, well-oriented. Cranial nerves II-XII grossly normal and symmetric.  Moves all limbs symmetrically with no evidence of significant focal weakness. No abnormal movements noted. Normal gait      ASSESSMENT AND PLAN: Mr. Mickey Vera is a 59 y.o. male with CORTEZ on CPAP, doing great overall. He is over 50 lbs lighter than his sleep studies in 2018 and probably needs lower pressure settings. He sometimes wakes up with air in his stomach, so I will lower his pressure range.      #CORTEZ - Patient's sleep apnea is under very good control with CPAP, he is deriving significant subjective benefit from treatment, his compliance is excellent, and mask leak is well controlled overall.  #aerophagia  -lower pressure range for pt's comfort due to rare, mild aerophagia - new range will be 8-12 cm H2O  -okay for now to continue using original machine until it starts to malfunction    #obesity  -continued weight loss efforts were encouraged.    All of the above was discussed with the patient in detail. He voiced an understanding of the above and was agreeable to proceed further as advised.     32 minutes were spent with the patient plus time spent reviewing the chart, updating the chart as needed, and documenting.     FOLLOW  "UP: 6 months         [1]   Patient Active Problem List  Diagnosis    Essential hypertension    Iron deficiency anemia    Mixed hyperlipidemia    Severe obstructive sleep apnea    Shortness of breath    Type 2 diabetes mellitus with hyperglycemia, with long-term current use of insulin    Long term (current) use of insulin (Multi)    Morbid obesity with BMI of 40.0-44.9, adult (Multi)    Pulmonary fibrosis (Multi)    At risk for deep venous thrombosis    Abnormal EKG   [2]   Past Medical History:  Diagnosis Date    Essential hypertension     History of COVID-19 12/2021    History of DVT (deep vein thrombosis)     Hyperlipidemia     Burtch    Hypertension     Iron deficiency anemia     Long term (current) use of insulin (Multi)     Mixed hyperlipidemia     Morbid obesity with BMI of 40.0-44.9, adult (Multi)     CORTEZ (obstructive sleep apnea)     Mendoza/Strausbaugh    Pulmonary fibrosis (Multi)     Mendoza/Strausbaugh    Type 2 diabetes mellitus     Burtch    Type 2 diabetes mellitus with hyperglycemia, with long-term current use of insulin    [3]   Past Surgical History:  Procedure Laterality Date    ANTERIOR CRUCIATE LIGAMENT REPAIR Right 1984    ESOPHAGOGASTRODUODENOSCOPY  08/2024    FINGER SURGERY Right     index finger amputation    HAND SURGERY Left 2014    metacarpal repair from [facility]    OTHER SURGICAL HISTORY Right 1984    ACL    OTHER SURGICAL HISTORY  07/1965    PYLORIC STENOSIS    RHINOPLASTY  1983   [4]   Current Outpatient Medications on File Prior to Visit   Medication Sig Dispense Refill    amLODIPine (Norvasc) 10 mg tablet TAKE 1 TABLET DAILY 90 tablet 0    BD Floridalma 2nd Gen Pen Needle 32 gauge x 5/32\" needle       blood sugar diagnostic (OneTouch Verio test strips) strip OneTouch Verio In Vitro Strip   Quantity: 150  Refills: 0        Start : 14-Aug-2018   Active      blood-glucose sensor (Dexcom G7 Sensor) device USE AS DIRECTED EVERY 10 DAYS 9 each 1    cholecalciferol (Vitamin D3) 1,250 mcg (50,000 unit) " tablet Take 1 tablet (50,000 Units) by mouth every 7 days. 12 tablet 3    dapagliflozin propanediol (Farxiga) 10 mg tablet Take 1 tablet (10 mg) by mouth once daily. 90 tablet 1    ferrous sulfate 325 (65 Fe) MG EC tablet Take 1 tablet by mouth once daily with breakfast. Do not crush, chew, or split. 90 tablet 3    insulin glargine (Basaglar KwikPen U-100 Insulin) 100 unit/mL (3 mL) pen  (Patient taking differently: 15 units in the evening) 50 mL 1    insulin lispro (HumaLOG KwikPen Insulin) 100 unit/mL injection  (Patient taking differently: Taking up 8 units as needed) 100 mL 1    losartan (Cozaar) 100 mg tablet TAKE ONE TABLET BY MOUTH DAILY 90 tablet 1    metFORMIN  mg 24 hr tablet TAKE 4 TABLETS DAILY 360 tablet 3    pioglitazone (Actos) 15 mg tablet TAKE 1 TABLET DAILY 90 tablet 3    tirzepatide (Mounjaro) 15 mg/0.5 mL pen injector Inject 15 mg under the skin every 7 days. 2 mL 3    [DISCONTINUED] atorvastatin (Lipitor) 40 mg tablet TAKE ONE TABLET BY MOUTH EVERY DAY 90 tablet 1     No current facility-administered medications on file prior to visit.

## 2025-05-29 PROCEDURE — RXMED WILLOW AMBULATORY MEDICATION CHARGE

## 2025-06-02 ENCOUNTER — PHARMACY VISIT (OUTPATIENT)
Dept: PHARMACY | Facility: CLINIC | Age: 60
End: 2025-06-02
Payer: COMMERCIAL

## 2025-06-23 PROBLEM — E11.65 TYPE 2 DIABETES MELLITUS WITH HYPERGLYCEMIA, WITH LONG-TERM CURRENT USE OF INSULIN: Status: RESOLVED | Noted: 2023-09-15 | Resolved: 2025-06-23

## 2025-06-23 PROBLEM — E11.9 TYPE 2 DIABETES MELLITUS WITHOUT COMPLICATION, WITHOUT LONG-TERM CURRENT USE OF INSULIN: Status: ACTIVE | Noted: 2025-06-23

## 2025-06-23 PROBLEM — Z79.4 TYPE 2 DIABETES MELLITUS WITH HYPERGLYCEMIA, WITH LONG-TERM CURRENT USE OF INSULIN: Status: RESOLVED | Noted: 2023-09-15 | Resolved: 2025-06-23

## 2025-06-23 PROBLEM — E66.01 MORBID OBESITY WITH BMI OF 40.0-44.9, ADULT (MULTI): Status: RESOLVED | Noted: 2024-02-18 | Resolved: 2025-06-23

## 2025-06-23 PROBLEM — E66.09 CLASS 1 OBESITY DUE TO EXCESS CALORIES WITHOUT SERIOUS COMORBIDITY WITH BODY MASS INDEX (BMI) OF 34.0 TO 34.9 IN ADULT: Status: ACTIVE | Noted: 2025-06-23

## 2025-06-23 PROBLEM — E66.811 CLASS 1 OBESITY DUE TO EXCESS CALORIES WITHOUT SERIOUS COMORBIDITY WITH BODY MASS INDEX (BMI) OF 34.0 TO 34.9 IN ADULT: Status: ACTIVE | Noted: 2025-06-23

## 2025-06-23 NOTE — PROGRESS NOTES
HPI:       Hypertension:          The patient has no issues but co bilateral hand/finger pain with bumps on DIP and PIP jts forming ane enlarging.  + RA  and psoriatic arthritis runs in the family and pt has psoriasis as well.         The patients cardiovascular risk factors include:         Cardiac Risk Factors  Age > 45-male, > 55-female:  YES  +1   Smoking:   NO   Sig. family hx of CHD*:  YES  +1   Hypertension:   YES  +1   Diabetes:   YES  +1   HDL < 35:   YES  +1   HDL > 59:   NO   Total: 5     *- Sig. family h/o CHD per NCEP = MI or sudden death at <56yo in   father or other 1st-degree male relative, or <66yo in mother or   other 1st-degree female relative           The patients adherence to the treatment regimen is Good         Responses to the medications has been Good    Hyperlipidemia:   The patient does not use medications that may worsen dyslipidemias (corticosteroids, progestins, anabolic steroids, diuretics, beta-blockers, amiodarone, cyclosporine, olanzapine). The patient exercises infrequently.  The patient is not known to have coexisting coronary artery disease.    Pt has IDDM and sees endocrinology.  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 07/10/2025    Mixed hyperlipidemia     Morbid obesity with BMI of 40.0-44.9, adult (Multi)     CORTEZ (obstructive sleep apnea)     Mendoza/Strausbaugh    Pneumonia     Pulmonary fibrosis (Multi)     Mendoza/Strausbaugh    Snoring     Type 2 diabetes mellitus     Burtch    Type 2 diabetes mellitus with hyperglycemia, with long-term current use of insulin   "    MEDICATIONS:   Current Outpatient Medications   Medication Sig Dispense Refill    amLODIPine (Norvasc) 10 mg tablet TAKE 1 TABLET DAILY 90 tablet 0    BD Floridalma 2nd Gen Pen Needle 32 gauge x 5/32\" needle Use  three times a day 300 each 0    blood sugar diagnostic (OneTouch Verio test strips) strip OneTouch Verio In Vitro Strip   Quantity: 150  Refills: 0        Start : 14-Aug-2018   Active      blood-glucose sensor (Dexcom G7 Sensor) device USE AS DIRECTED EVERY 10 DAYS 9 each 1    cholecalciferol (Vitamin D3) 1,250 mcg (50,000 unit) tablet Take 1 tablet (50,000 Units) by mouth every 7 days. 12 tablet 3    dapagliflozin propanediol (Farxiga) 10 mg tablet Take 1 tablet (10 mg) by mouth once daily. 90 tablet 1    ferrous sulfate 325 (65 Fe) MG EC tablet Take 1 tablet by mouth once daily with breakfast. Do not crush, chew, or split. 90 tablet 3    insulin glargine (Basaglar KwikPen U-100 Insulin) 100 unit/mL (3 mL) pen  (Patient taking differently: 15 units in the evening) 50 mL 1    insulin lispro (HumaLOG KwikPen Insulin) 100 unit/mL injection  (Patient taking differently: Taking up 8 units as needed) 100 mL 1    metFORMIN  mg 24 hr tablet TAKE 4 TABLETS DAILY 360 tablet 3    pioglitazone (Actos) 15 mg tablet TAKE 1 TABLET DAILY 90 tablet 3    tirzepatide (Mounjaro) 15 mg/0.5 mL pen injector Inject 15 mg under the skin every 7 days. 2 mL 3    losartan (Cozaar) 100 mg tablet Take 1 tablet (100 mg) by mouth once daily. 90 tablet 1     No current facility-administered medications for this visit.     ALLERGIES:   Allergies   Allergen Reactions    Ace Inhibitors Cough     FAMILY HISTORY:   Family History   Problem Relation Name Age of Onset    Hypothyroidism Mother EJP     Hypertension Mother EJP     Other (MORBID OBESITY) Mother EJP         GASTRIC BYPASS    Heart disease Father KDP         with stent at age 60    Hypertension Father KDP     Other (MORBID OBESITY) Father KDP         SLEEVE    Arthritis Father KDP "     Diabetes Father KDP     No Known Problems Sister      No Known Problems Daughter      Other (HTN) Son      Heart disease Maternal Grandmother Eulah     Cardiomyopathy Maternal Grandmother Eulah     Diabetes Paternal Grandmother Urline     Hypertension Paternal Grandmother Urline     Transient ischemic attack Paternal Grandmother Urline     Heart disease Paternal Grandfather Ramos     Heart attack Paternal Grandfather Ramos     Cardiomyopathy Paternal Grandfather Ramos     Stroke Paternal Grandfather Ramos     COPD Paternal Grandfather Ramos      SOCIAL HISTORY:   Social History     Tobacco Use    Smoking status: Former     Current packs/day: 0.00     Average packs/day: 1 pack/day for 23.1 years (23.1 ttl pk-yrs)     Types: Cigarettes, Cigars     Start date: 10/1/1985     Quit date: 11/15/2008     Years since quittin.7     Passive exposure: Current    Smokeless tobacco: Former     Types: Snuff     Quit date: 1998    Tobacco comments:     Occasional cigar   Vaping Use    Vaping status: Never Used   Substance Use Topics    Alcohol use: Not Currently    Drug use: Never         ROS:      CONSTITUTION:  alert and oriented     OPHTHALMOLOGY:          no Change in vision.         CARDIOLOGY:          no Chest pain.  no Dyspnea on exertion.  no Shortness of breath.         ENDOCRINOLOGY:          no Excessive thirst.  no Excessive urination.  no Fatigue.  no Skin changes.  no Weight gain.  no Weight loss.         SKIN:          no Healing problems.         NEUROLOGY:          no Loss of sensation in specific body area.  no Burning pain in feet.  no Peripheral neuropathy.  no Tingling/numbness.    LABS:   Lab Results   Component Value Date    GLUCOSE 151 (H) 2025    CALCIUM 9.4 2025     2025    K 4.2 2025    CO2 25 2025     2025    BUN 25 2025    CREATININE 1.30 2025     Lab Results   Component Value Date    CHOL 170 2025    CHOL 169 2024    CHOL 172  02/26/2024     Lab Results   Component Value Date    HDL 40 04/29/2025    HDL 28.4 07/30/2024    HDL 35.4 02/26/2024     Lab Results   Component Value Date    LDLCALC 104 (H) 04/29/2025    LDLCALC 82 07/30/2024    LDLCALC 111 (H) 02/26/2024     Lab Results   Component Value Date    TRIG 143 04/29/2025    TRIG 292 (H) 07/30/2024    TRIG 129 02/26/2024      Latest Reference Range & Units 04/29/25 08:18   CREATININE, RANDOM URINE 20 - 320 mg/dL 99   ALBUMIN/CREATININE RATIO, RANDOM URINE <30 mg/g creat 41 (H)   ALBUMIN, URINE See Note: mg/dL 4.1   (H): Data is abnormally high         VITAL SIGNS:  /74   Pulse 75   Wt 120 kg (264 lb 4.8 oz)   SpO2 99%   BMI 34.87 kg/m²     General Appearance: awake, alert, oriented, in no acute distress  Lungs: Normal expansion.  Clear to auscultation.  No rales, rhonchi, or wheezing.  Heart: Heart sounds are normal.  Regular rate and rhythm without murmur, gallop or rub.  Extremities: Extremities warm to touch, pink, with no edema.  Neurologic: Alert and oriented x 3, gait normal., reflexes normal and symmetric, strength and  sensation grossly normal    Mickey was seen today for hypertension.  Diagnoses and all orders for this visit:  Essential hypertension (Primary)  Comments:  cont amlodipine  Orders:  -     Follow Up In Primary Care - Established  -     losartan (Cozaar) 100 mg tablet; Take 1 tablet (100 mg) by mouth once daily.  -     Follow Up In Primary Care - Established; Future  Mixed hyperlipidemia  Type 2 diabetes mellitus without complication, without long-term current use of insulin  Comments:  f/u with endo and cont current meds  Class 1 obesity due to excess calories without serious comorbidity with body mass index (BMI) of 34.0 to 34.9 in adult  Bilateral hand pain  -     XR hand 1-2 views bilateral; Future

## 2025-06-27 DIAGNOSIS — Z79.4 TYPE 2 DIABETES MELLITUS WITH HYPERGLYCEMIA, WITH LONG-TERM CURRENT USE OF INSULIN: ICD-10-CM

## 2025-06-27 DIAGNOSIS — E11.65 TYPE 2 DIABETES MELLITUS WITH HYPERGLYCEMIA, WITH LONG-TERM CURRENT USE OF INSULIN: ICD-10-CM

## 2025-06-27 PROCEDURE — RXMED WILLOW AMBULATORY MEDICATION CHARGE

## 2025-06-27 RX ORDER — TIRZEPATIDE 15 MG/.5ML
15 INJECTION, SOLUTION SUBCUTANEOUS
Qty: 2 ML | Refills: 3 | Status: SHIPPED | OUTPATIENT
Start: 2025-06-27

## 2025-06-27 RX ORDER — DAPAGLIFLOZIN 10 MG/1
10 TABLET, FILM COATED ORAL DAILY
Qty: 90 TABLET | Refills: 1 | Status: SHIPPED | OUTPATIENT
Start: 2025-06-27 | End: 2026-06-27

## 2025-07-01 ENCOUNTER — PHARMACY VISIT (OUTPATIENT)
Dept: PHARMACY | Facility: CLINIC | Age: 60
End: 2025-07-01
Payer: COMMERCIAL

## 2025-07-02 DIAGNOSIS — E11.65 TYPE 2 DIABETES MELLITUS WITH HYPERGLYCEMIA, WITH LONG-TERM CURRENT USE OF INSULIN: Primary | ICD-10-CM

## 2025-07-02 DIAGNOSIS — Z79.4 TYPE 2 DIABETES MELLITUS WITH HYPERGLYCEMIA, WITH LONG-TERM CURRENT USE OF INSULIN: Primary | ICD-10-CM

## 2025-07-02 RX ORDER — PEN NEEDLE, DIABETIC 32GX 5/32"
NEEDLE, DISPOSABLE MISCELLANEOUS
Qty: 300 EACH | Refills: 0 | Status: SHIPPED | OUTPATIENT
Start: 2025-07-02

## 2025-07-10 DIAGNOSIS — E11.65 TYPE 2 DIABETES MELLITUS WITH HYPERGLYCEMIA, WITH LONG-TERM CURRENT USE OF INSULIN: ICD-10-CM

## 2025-07-10 DIAGNOSIS — Z79.4 TYPE 2 DIABETES MELLITUS WITH HYPERGLYCEMIA, WITH LONG-TERM CURRENT USE OF INSULIN: ICD-10-CM

## 2025-07-10 DIAGNOSIS — I10 ESSENTIAL HYPERTENSION: ICD-10-CM

## 2025-07-10 PROBLEM — E11.3299 MILD NONPROLIFERATIVE DIABETIC RETINOPATHY WITHOUT MACULAR EDEMA ASSOCIATED WITH TYPE 2 DIABETES MELLITUS: Status: ACTIVE | Noted: 2025-07-10

## 2025-07-10 RX ORDER — AMLODIPINE BESYLATE 10 MG/1
10 TABLET ORAL DAILY
Qty: 90 TABLET | Refills: 0 | Status: SHIPPED | OUTPATIENT
Start: 2025-07-10

## 2025-07-10 RX ORDER — PIOGLITAZONE 15 MG/1
15 TABLET ORAL DAILY
Qty: 90 TABLET | Refills: 3 | Status: SHIPPED | OUTPATIENT
Start: 2025-07-10

## 2025-07-22 PROCEDURE — RXMED WILLOW AMBULATORY MEDICATION CHARGE

## 2025-07-24 ENCOUNTER — PHARMACY VISIT (OUTPATIENT)
Dept: PHARMACY | Facility: CLINIC | Age: 60
End: 2025-07-24
Payer: COMMERCIAL

## 2025-07-25 PROCEDURE — RXMED WILLOW AMBULATORY MEDICATION CHARGE

## 2025-07-28 ENCOUNTER — HOSPITAL ENCOUNTER (OUTPATIENT)
Dept: RADIOLOGY | Facility: HOSPITAL | Age: 60
Discharge: HOME | End: 2025-07-28
Payer: COMMERCIAL

## 2025-07-28 ENCOUNTER — OFFICE VISIT (OUTPATIENT)
Dept: PRIMARY CARE | Facility: CLINIC | Age: 60
End: 2025-07-28
Payer: COMMERCIAL

## 2025-07-28 VITALS
SYSTOLIC BLOOD PRESSURE: 128 MMHG | HEART RATE: 75 BPM | BODY MASS INDEX: 34.87 KG/M2 | DIASTOLIC BLOOD PRESSURE: 74 MMHG | WEIGHT: 264.3 LBS | OXYGEN SATURATION: 99 %

## 2025-07-28 DIAGNOSIS — M79.642 BILATERAL HAND PAIN: ICD-10-CM

## 2025-07-28 DIAGNOSIS — M79.641 BILATERAL HAND PAIN: ICD-10-CM

## 2025-07-28 DIAGNOSIS — I10 ESSENTIAL HYPERTENSION: Primary | ICD-10-CM

## 2025-07-28 DIAGNOSIS — E78.2 MIXED HYPERLIPIDEMIA: ICD-10-CM

## 2025-07-28 DIAGNOSIS — E11.9 TYPE 2 DIABETES MELLITUS WITHOUT COMPLICATION, WITHOUT LONG-TERM CURRENT USE OF INSULIN: ICD-10-CM

## 2025-07-28 DIAGNOSIS — E66.09 CLASS 1 OBESITY DUE TO EXCESS CALORIES WITHOUT SERIOUS COMORBIDITY WITH BODY MASS INDEX (BMI) OF 34.0 TO 34.9 IN ADULT: ICD-10-CM

## 2025-07-28 DIAGNOSIS — E66.811 CLASS 1 OBESITY DUE TO EXCESS CALORIES WITHOUT SERIOUS COMORBIDITY WITH BODY MASS INDEX (BMI) OF 34.0 TO 34.9 IN ADULT: ICD-10-CM

## 2025-07-28 PROBLEM — R06.02 SHORTNESS OF BREATH: Status: RESOLVED | Noted: 2023-09-15 | Resolved: 2025-07-28

## 2025-07-28 PROBLEM — R94.31 ABNORMAL EKG: Status: RESOLVED | Noted: 2024-08-12 | Resolved: 2025-07-28

## 2025-07-28 PROCEDURE — 3078F DIAST BP <80 MM HG: CPT | Performed by: FAMILY MEDICINE

## 2025-07-28 PROCEDURE — 73130 X-RAY EXAM OF HAND: CPT | Mod: 50

## 2025-07-28 PROCEDURE — 3044F HG A1C LEVEL LT 7.0%: CPT | Performed by: FAMILY MEDICINE

## 2025-07-28 PROCEDURE — 73130 X-RAY EXAM OF HAND: CPT | Mod: BILATERAL PROCEDURE | Performed by: RADIOLOGY

## 2025-07-28 PROCEDURE — 4010F ACE/ARB THERAPY RXD/TAKEN: CPT | Performed by: FAMILY MEDICINE

## 2025-07-28 PROCEDURE — 3074F SYST BP LT 130 MM HG: CPT | Performed by: FAMILY MEDICINE

## 2025-07-28 PROCEDURE — 99214 OFFICE O/P EST MOD 30 MIN: CPT | Performed by: FAMILY MEDICINE

## 2025-07-28 RX ORDER — LOSARTAN POTASSIUM 100 MG/1
100 TABLET ORAL DAILY
Qty: 90 TABLET | Refills: 1 | Status: SHIPPED | OUTPATIENT
Start: 2025-07-28

## 2025-07-28 ASSESSMENT — PAIN SCALES - GENERAL: PAINLEVEL_OUTOF10: 0-NO PAIN

## 2025-07-29 ENCOUNTER — PHARMACY VISIT (OUTPATIENT)
Dept: PHARMACY | Facility: CLINIC | Age: 60
End: 2025-07-29
Payer: COMMERCIAL

## 2025-08-19 PROCEDURE — RXMED WILLOW AMBULATORY MEDICATION CHARGE

## 2025-08-25 ENCOUNTER — PHARMACY VISIT (OUTPATIENT)
Dept: PHARMACY | Facility: CLINIC | Age: 60
End: 2025-08-25
Payer: COMMERCIAL

## 2025-08-25 PROCEDURE — RXMED WILLOW AMBULATORY MEDICATION CHARGE

## 2025-08-28 ENCOUNTER — PHARMACY VISIT (OUTPATIENT)
Dept: PHARMACY | Facility: CLINIC | Age: 60
End: 2025-08-28
Payer: COMMERCIAL

## 2025-10-06 ENCOUNTER — APPOINTMENT (OUTPATIENT)
Dept: ENDOCRINOLOGY | Facility: CLINIC | Age: 60
End: 2025-10-06
Payer: COMMERCIAL

## 2025-11-03 ENCOUNTER — APPOINTMENT (OUTPATIENT)
Dept: SLEEP MEDICINE | Facility: CLINIC | Age: 60
End: 2025-11-03
Payer: COMMERCIAL